# Patient Record
Sex: MALE | Race: WHITE | NOT HISPANIC OR LATINO | URBAN - METROPOLITAN AREA
[De-identification: names, ages, dates, MRNs, and addresses within clinical notes are randomized per-mention and may not be internally consistent; named-entity substitution may affect disease eponyms.]

---

## 2020-09-26 ENCOUNTER — LAB REQUISITION (OUTPATIENT)
Dept: LAB | Facility: HOSPITAL | Age: 65
End: 2020-09-26

## 2020-09-26 DIAGNOSIS — Z00.00 ROUTINE GENERAL MEDICAL EXAMINATION AT A HEALTH CARE FACILITY: ICD-10-CM

## 2020-09-26 LAB
BASOPHILS # BLD AUTO: 0.08 10*3/MM3 (ref 0–0.2)
BASOPHILS NFR BLD AUTO: 0.4 % (ref 0–1.5)
DEPRECATED RDW RBC AUTO: 40.4 FL (ref 37–54)
EOSINOPHIL # BLD AUTO: 0.02 10*3/MM3 (ref 0–0.4)
EOSINOPHIL NFR BLD AUTO: 0.1 % (ref 0.3–6.2)
ERYTHROCYTE [DISTWIDTH] IN BLOOD BY AUTOMATED COUNT: 14.4 % (ref 12.3–15.4)
HCT VFR BLD AUTO: 37 % (ref 37.5–51)
HGB BLD-MCNC: 11.5 G/DL (ref 13–17.7)
INR PPP: 1.51 (ref 0.9–1.1)
LARGE PLATELETS: NORMAL
LYMPHOCYTES # BLD AUTO: 0.97 10*3/MM3 (ref 0.7–3.1)
LYMPHOCYTES NFR BLD AUTO: 4.5 % (ref 19.6–45.3)
MCH RBC QN AUTO: 24.5 PG (ref 26.6–33)
MCHC RBC AUTO-ENTMCNC: 31.1 G/DL (ref 31.5–35.7)
MCV RBC AUTO: 78.9 FL (ref 79–97)
MONOCYTES # BLD AUTO: 1.59 10*3/MM3 (ref 0.1–0.9)
MONOCYTES NFR BLD AUTO: 7.3 % (ref 5–12)
NEUTROPHILS NFR BLD AUTO: 18.82 10*3/MM3 (ref 1.7–7)
NEUTROPHILS NFR BLD AUTO: 86.7 % (ref 42.7–76)
PLATELET # BLD AUTO: 252 10*3/MM3 (ref 140–450)
PMV BLD AUTO: 13.3 FL (ref 6–12)
PROTHROMBIN TIME: 17.9 SECONDS (ref 11.7–14.2)
RBC # BLD AUTO: 4.69 10*6/MM3 (ref 4.14–5.8)
WBC # BLD AUTO: 21.69 10*3/MM3 (ref 3.4–10.8)

## 2020-09-26 PROCEDURE — 85025 COMPLETE CBC W/AUTO DIFF WBC: CPT

## 2020-09-26 PROCEDURE — 85610 PROTHROMBIN TIME: CPT

## 2020-09-26 PROCEDURE — 85007 BL SMEAR W/DIFF WBC COUNT: CPT

## 2020-11-14 ENCOUNTER — LAB REQUISITION (OUTPATIENT)
Dept: LAB | Facility: HOSPITAL | Age: 65
End: 2020-11-14

## 2020-11-14 DIAGNOSIS — Z00.00 ROUTINE GENERAL MEDICAL EXAMINATION AT A HEALTH CARE FACILITY: ICD-10-CM

## 2020-11-14 LAB
INR PPP: 3.53 (ref 0.9–1.1)
PROTHROMBIN TIME: 35.1 SECONDS (ref 11.7–14.2)

## 2020-11-14 PROCEDURE — 85610 PROTHROMBIN TIME: CPT

## 2020-11-21 ENCOUNTER — LAB REQUISITION (OUTPATIENT)
Dept: LAB | Facility: HOSPITAL | Age: 65
End: 2020-11-21

## 2020-11-21 DIAGNOSIS — Z00.00 ROUTINE GENERAL MEDICAL EXAMINATION AT A HEALTH CARE FACILITY: ICD-10-CM

## 2020-11-21 LAB
ANION GAP SERPL CALCULATED.3IONS-SCNC: 8.3 MMOL/L (ref 5–15)
BASOPHILS # BLD AUTO: 0.03 10*3/MM3 (ref 0–0.2)
BASOPHILS NFR BLD AUTO: 0.4 % (ref 0–1.5)
BUN SERPL-MCNC: 15 MG/DL (ref 8–23)
BUN/CREAT SERPL: 11.9 (ref 7–25)
CALCIUM SPEC-SCNC: 7.6 MG/DL (ref 8.6–10.5)
CHLORIDE SERPL-SCNC: 104 MMOL/L (ref 98–107)
CO2 SERPL-SCNC: 25.7 MMOL/L (ref 22–29)
CREAT SERPL-MCNC: 1.26 MG/DL (ref 0.76–1.27)
DEPRECATED RDW RBC AUTO: 46.8 FL (ref 37–54)
EOSINOPHIL # BLD AUTO: 0.16 10*3/MM3 (ref 0–0.4)
EOSINOPHIL NFR BLD AUTO: 2.2 % (ref 0.3–6.2)
ERYTHROCYTE [DISTWIDTH] IN BLOOD BY AUTOMATED COUNT: 15.2 % (ref 12.3–15.4)
GFR SERPL CREATININE-BSD FRML MDRD: 57 ML/MIN/1.73
GLUCOSE SERPL-MCNC: 48 MG/DL (ref 65–99)
HCT VFR BLD AUTO: 25.2 % (ref 37.5–51)
HGB BLD-MCNC: 7.6 G/DL (ref 13–17.7)
IMM GRANULOCYTES # BLD AUTO: 0.02 10*3/MM3 (ref 0–0.05)
IMM GRANULOCYTES NFR BLD AUTO: 0.3 % (ref 0–0.5)
LYMPHOCYTES # BLD AUTO: 1.02 10*3/MM3 (ref 0.7–3.1)
LYMPHOCYTES NFR BLD AUTO: 13.9 % (ref 19.6–45.3)
MCH RBC QN AUTO: 25.3 PG (ref 26.6–33)
MCHC RBC AUTO-ENTMCNC: 30.2 G/DL (ref 31.5–35.7)
MCV RBC AUTO: 84 FL (ref 79–97)
MONOCYTES # BLD AUTO: 0.56 10*3/MM3 (ref 0.1–0.9)
MONOCYTES NFR BLD AUTO: 7.6 % (ref 5–12)
NEUTROPHILS NFR BLD AUTO: 5.54 10*3/MM3 (ref 1.7–7)
NEUTROPHILS NFR BLD AUTO: 75.6 % (ref 42.7–76)
NRBC BLD AUTO-RTO: 0 /100 WBC (ref 0–0.2)
PLATELET # BLD AUTO: 298 10*3/MM3 (ref 140–450)
PMV BLD AUTO: 11.6 FL (ref 6–12)
POTASSIUM SERPL-SCNC: 4.3 MMOL/L (ref 3.5–5.2)
RBC # BLD AUTO: 3 10*6/MM3 (ref 4.14–5.8)
SODIUM SERPL-SCNC: 138 MMOL/L (ref 136–145)
WBC # BLD AUTO: 7.33 10*3/MM3 (ref 3.4–10.8)

## 2020-11-21 PROCEDURE — 80048 BASIC METABOLIC PNL TOTAL CA: CPT

## 2020-11-21 PROCEDURE — 85025 COMPLETE CBC W/AUTO DIFF WBC: CPT

## 2020-11-28 ENCOUNTER — LAB REQUISITION (OUTPATIENT)
Dept: LAB | Facility: HOSPITAL | Age: 65
End: 2020-11-28

## 2020-11-28 DIAGNOSIS — Z00.00 ROUTINE GENERAL MEDICAL EXAMINATION AT A HEALTH CARE FACILITY: ICD-10-CM

## 2020-11-28 LAB
INR PPP: 3.18 (ref 0.9–1.1)
PROTHROMBIN TIME: 32.4 SECONDS (ref 11.7–14.2)

## 2020-11-28 PROCEDURE — 85610 PROTHROMBIN TIME: CPT

## 2020-12-07 ENCOUNTER — LAB REQUISITION (OUTPATIENT)
Dept: LAB | Facility: HOSPITAL | Age: 65
End: 2020-12-07

## 2020-12-07 DIAGNOSIS — Z00.00 ROUTINE GENERAL MEDICAL EXAMINATION AT A HEALTH CARE FACILITY: ICD-10-CM

## 2020-12-07 LAB
INR PPP: 1.78 (ref 0.9–1.1)
PROTHROMBIN TIME: 20.4 SECONDS (ref 11.7–14.2)

## 2020-12-07 PROCEDURE — 85610 PROTHROMBIN TIME: CPT

## 2020-12-19 ENCOUNTER — LAB REQUISITION (OUTPATIENT)
Dept: LAB | Facility: HOSPITAL | Age: 65
End: 2020-12-19

## 2020-12-19 DIAGNOSIS — Z00.00 ROUTINE GENERAL MEDICAL EXAMINATION AT A HEALTH CARE FACILITY: ICD-10-CM

## 2020-12-19 LAB
ANION GAP SERPL CALCULATED.3IONS-SCNC: 13.9 MMOL/L (ref 5–15)
BASOPHILS # BLD AUTO: 0.04 10*3/MM3 (ref 0–0.2)
BASOPHILS NFR BLD AUTO: 0.4 % (ref 0–1.5)
BUN SERPL-MCNC: 13 MG/DL (ref 8–23)
BUN/CREAT SERPL: 8.5 (ref 7–25)
CALCIUM SPEC-SCNC: 8.9 MG/DL (ref 8.6–10.5)
CHLORIDE SERPL-SCNC: 103 MMOL/L (ref 98–107)
CO2 SERPL-SCNC: 23.1 MMOL/L (ref 22–29)
CREAT SERPL-MCNC: 1.53 MG/DL (ref 0.76–1.27)
DEPRECATED RDW RBC AUTO: 44.7 FL (ref 37–54)
EOSINOPHIL # BLD AUTO: 0.14 10*3/MM3 (ref 0–0.4)
EOSINOPHIL NFR BLD AUTO: 1.3 % (ref 0.3–6.2)
ERYTHROCYTE [DISTWIDTH] IN BLOOD BY AUTOMATED COUNT: 15.1 % (ref 12.3–15.4)
GFR SERPL CREATININE-BSD FRML MDRD: 46 ML/MIN/1.73
GLUCOSE SERPL-MCNC: 55 MG/DL (ref 65–99)
HCT VFR BLD AUTO: 34.4 % (ref 37.5–51)
HGB BLD-MCNC: 10.3 G/DL (ref 13–17.7)
IMM GRANULOCYTES # BLD AUTO: 0.03 10*3/MM3 (ref 0–0.05)
IMM GRANULOCYTES NFR BLD AUTO: 0.3 % (ref 0–0.5)
LYMPHOCYTES # BLD AUTO: 0.86 10*3/MM3 (ref 0.7–3.1)
LYMPHOCYTES NFR BLD AUTO: 8.1 % (ref 19.6–45.3)
MCH RBC QN AUTO: 24.5 PG (ref 26.6–33)
MCHC RBC AUTO-ENTMCNC: 29.9 G/DL (ref 31.5–35.7)
MCV RBC AUTO: 81.9 FL (ref 79–97)
MONOCYTES # BLD AUTO: 0.65 10*3/MM3 (ref 0.1–0.9)
MONOCYTES NFR BLD AUTO: 6.2 % (ref 5–12)
NEUTROPHILS NFR BLD AUTO: 8.84 10*3/MM3 (ref 1.7–7)
NEUTROPHILS NFR BLD AUTO: 83.7 % (ref 42.7–76)
NRBC BLD AUTO-RTO: 0 /100 WBC (ref 0–0.2)
PLATELET # BLD AUTO: 193 10*3/MM3 (ref 140–450)
PMV BLD AUTO: 12.2 FL (ref 6–12)
POTASSIUM SERPL-SCNC: 4.5 MMOL/L (ref 3.5–5.2)
RBC # BLD AUTO: 4.2 10*6/MM3 (ref 4.14–5.8)
SODIUM SERPL-SCNC: 140 MMOL/L (ref 136–145)
WBC # BLD AUTO: 10.56 10*3/MM3 (ref 3.4–10.8)

## 2020-12-19 PROCEDURE — 85025 COMPLETE CBC W/AUTO DIFF WBC: CPT

## 2020-12-19 PROCEDURE — 80048 BASIC METABOLIC PNL TOTAL CA: CPT

## 2024-06-14 ENCOUNTER — HOSPITAL ENCOUNTER (INPATIENT)
Facility: HOSPITAL | Age: 69
LOS: 5 days | Discharge: NURSING, REHAB OR LONG-TERM CARE | End: 2024-06-19
Attending: EMERGENCY MEDICINE | Admitting: INTERNAL MEDICINE
Payer: MEDICARE

## 2024-06-14 ENCOUNTER — APPOINTMENT (OUTPATIENT)
Dept: CT IMAGING | Facility: HOSPITAL | Age: 69
End: 2024-06-14
Payer: MEDICARE

## 2024-06-14 ENCOUNTER — APPOINTMENT (OUTPATIENT)
Dept: GENERAL RADIOLOGY | Facility: HOSPITAL | Age: 69
End: 2024-06-14
Payer: MEDICARE

## 2024-06-14 DIAGNOSIS — J96.01 ACUTE RESPIRATORY FAILURE WITH HYPOXIA AND HYPERCAPNIA: ICD-10-CM

## 2024-06-14 DIAGNOSIS — I50.23 ACUTE ON CHRONIC SYSTOLIC CONGESTIVE HEART FAILURE: Primary | ICD-10-CM

## 2024-06-14 DIAGNOSIS — J96.02 ACUTE RESPIRATORY FAILURE WITH HYPOXIA AND HYPERCAPNIA: ICD-10-CM

## 2024-06-14 DIAGNOSIS — J44.1 COPD EXACERBATION: ICD-10-CM

## 2024-06-14 DIAGNOSIS — E11.40 TYPE 2 DIABETES MELLITUS WITH DIABETIC NEUROPATHY, UNSPECIFIED WHETHER LONG TERM INSULIN USE: ICD-10-CM

## 2024-06-14 PROBLEM — Z86.73 HISTORY OF CVA (CEREBROVASCULAR ACCIDENT): Status: ACTIVE | Noted: 2024-06-14

## 2024-06-14 PROBLEM — I10 ESSENTIAL HYPERTENSION: Status: ACTIVE | Noted: 2024-06-14

## 2024-06-14 PROBLEM — N17.9 AKI (ACUTE KIDNEY INJURY): Status: ACTIVE | Noted: 2024-06-14

## 2024-06-14 PROBLEM — D64.9 ANEMIA: Status: ACTIVE | Noted: 2024-06-14

## 2024-06-14 PROBLEM — I48.0 PAROXYSMAL ATRIAL FIBRILLATION: Status: ACTIVE | Noted: 2024-06-14

## 2024-06-14 PROBLEM — E78.2 MIXED HYPERLIPIDEMIA: Status: ACTIVE | Noted: 2024-06-14

## 2024-06-14 LAB
ALBUMIN SERPL-MCNC: 3.7 G/DL (ref 3.5–5.2)
ALBUMIN/GLOB SERPL: 1 G/DL
ALP SERPL-CCNC: 102 U/L (ref 39–117)
ALT SERPL W P-5'-P-CCNC: 15 U/L (ref 1–41)
ANION GAP SERPL CALCULATED.3IONS-SCNC: 10 MMOL/L (ref 5–15)
ANION GAP SERPL CALCULATED.3IONS-SCNC: 8 MMOL/L (ref 5–15)
ARTERIAL PATENCY WRIST A: ABNORMAL
AST SERPL-CCNC: 19 U/L (ref 1–40)
ATMOSPHERIC PRESS: ABNORMAL MM[HG]
BACTERIA UR QL AUTO: ABNORMAL /HPF
BASE EXCESS BLDA CALC-SCNC: -2.9 MMOL/L (ref 0–2)
BASOPHILS # BLD AUTO: 0.03 10*3/MM3 (ref 0–0.2)
BASOPHILS # BLD MANUAL: 0 10*3/MM3 (ref 0–0.2)
BASOPHILS NFR BLD AUTO: 0.3 % (ref 0–1.5)
BASOPHILS NFR BLD MANUAL: 0 % (ref 0–1.5)
BDY SITE: ABNORMAL
BILIRUB SERPL-MCNC: 0.4 MG/DL (ref 0–1.2)
BILIRUB UR QL STRIP: NEGATIVE
BODY TEMPERATURE: 37
BUN SERPL-MCNC: 24 MG/DL (ref 8–23)
BUN SERPL-MCNC: 26 MG/DL (ref 8–23)
BUN/CREAT SERPL: 10.7 (ref 7–25)
BUN/CREAT SERPL: 11.4 (ref 7–25)
CALCIUM SPEC-SCNC: 8.6 MG/DL (ref 8.6–10.5)
CALCIUM SPEC-SCNC: 8.8 MG/DL (ref 8.6–10.5)
CHLORIDE SERPL-SCNC: 106 MMOL/L (ref 98–107)
CHLORIDE SERPL-SCNC: 108 MMOL/L (ref 98–107)
CLARITY UR: CLEAR
CO2 BLDA-SCNC: 26.2 MMOL/L (ref 22–33)
CO2 SERPL-SCNC: 23 MMOL/L (ref 22–29)
CO2 SERPL-SCNC: 25 MMOL/L (ref 22–29)
COHGB MFR BLD: 1.5 % (ref 0–2)
COLOR UR: YELLOW
CREAT SERPL-MCNC: 2.24 MG/DL (ref 0.76–1.27)
CREAT SERPL-MCNC: 2.29 MG/DL (ref 0.76–1.27)
DEPRECATED RDW RBC AUTO: 48.3 FL (ref 37–54)
DEPRECATED RDW RBC AUTO: 49.2 FL (ref 37–54)
EGFRCR SERPLBLD CKD-EPI 2021: 30 ML/MIN/1.73
EGFRCR SERPLBLD CKD-EPI 2021: 30.8 ML/MIN/1.73
EOSINOPHIL # BLD AUTO: 0.08 10*3/MM3 (ref 0–0.4)
EOSINOPHIL # BLD MANUAL: 0.42 10*3/MM3 (ref 0–0.4)
EOSINOPHIL NFR BLD AUTO: 0.8 % (ref 0.3–6.2)
EOSINOPHIL NFR BLD MANUAL: 4 % (ref 0.3–6.2)
EPAP: 0
ERYTHROCYTE [DISTWIDTH] IN BLOOD BY AUTOMATED COUNT: 15.8 % (ref 12.3–15.4)
ERYTHROCYTE [DISTWIDTH] IN BLOOD BY AUTOMATED COUNT: 15.9 % (ref 12.3–15.4)
FERRITIN SERPL-MCNC: 115.1 NG/ML (ref 30–400)
FLUAV RNA RESP QL NAA+PROBE: NOT DETECTED
FLUBV RNA RESP QL NAA+PROBE: NOT DETECTED
FOLATE SERPL-MCNC: >20 NG/ML (ref 4.78–24.2)
GEN 5 2HR TROPONIN T REFLEX: 117 NG/L
GLOBULIN UR ELPH-MCNC: 3.8 GM/DL
GLUCOSE BLDC GLUCOMTR-MCNC: 183 MG/DL (ref 70–130)
GLUCOSE BLDC GLUCOMTR-MCNC: 217 MG/DL (ref 70–130)
GLUCOSE BLDC GLUCOMTR-MCNC: 224 MG/DL (ref 70–130)
GLUCOSE BLDC GLUCOMTR-MCNC: 281 MG/DL (ref 70–130)
GLUCOSE SERPL-MCNC: 129 MG/DL (ref 65–99)
GLUCOSE SERPL-MCNC: 142 MG/DL (ref 65–99)
GLUCOSE UR STRIP-MCNC: NEGATIVE MG/DL
HBA1C MFR BLD: 5.3 % (ref 4.8–5.6)
HCO3 BLDA-SCNC: 24.5 MMOL/L (ref 20–26)
HCT VFR BLD AUTO: 29.9 % (ref 37.5–51)
HCT VFR BLD AUTO: 30.5 % (ref 37.5–51)
HCT VFR BLD CALC: 28 % (ref 38–51)
HGB BLD-MCNC: 8.9 G/DL (ref 13–17.7)
HGB BLD-MCNC: 8.9 G/DL (ref 13–17.7)
HGB BLDA-MCNC: 9.1 G/DL (ref 13.5–17.5)
HGB UR QL STRIP.AUTO: NEGATIVE
HOLD SPECIMEN: NORMAL
HYALINE CASTS UR QL AUTO: ABNORMAL /LPF
IMM GRANULOCYTES # BLD AUTO: 0.06 10*3/MM3 (ref 0–0.05)
IMM GRANULOCYTES NFR BLD AUTO: 0.6 % (ref 0–0.5)
INHALED O2 CONCENTRATION: 36 %
IPAP: 0
IRON 24H UR-MRATE: 39 MCG/DL (ref 59–158)
IRON SATN MFR SERPL: 15 % (ref 20–50)
KETONES UR QL STRIP: NEGATIVE
LEUKOCYTE ESTERASE UR QL STRIP.AUTO: ABNORMAL
LYMPHOCYTES # BLD AUTO: 0.3 10*3/MM3 (ref 0.7–3.1)
LYMPHOCYTES # BLD MANUAL: 0.32 10*3/MM3 (ref 0.7–3.1)
LYMPHOCYTES NFR BLD AUTO: 2.8 % (ref 19.6–45.3)
LYMPHOCYTES NFR BLD MANUAL: 4 % (ref 5–12)
MCH RBC QN AUTO: 24.7 PG (ref 26.6–33)
MCH RBC QN AUTO: 25.1 PG (ref 26.6–33)
MCHC RBC AUTO-ENTMCNC: 29.2 G/DL (ref 31.5–35.7)
MCHC RBC AUTO-ENTMCNC: 29.8 G/DL (ref 31.5–35.7)
MCV RBC AUTO: 84.5 FL (ref 79–97)
MCV RBC AUTO: 84.7 FL (ref 79–97)
METHGB BLD QL: 0.4 % (ref 0–1.5)
MODALITY: ABNORMAL
MONOCYTES # BLD AUTO: 0.12 10*3/MM3 (ref 0.1–0.9)
MONOCYTES # BLD: 0.42 10*3/MM3 (ref 0.1–0.9)
MONOCYTES NFR BLD AUTO: 1.1 % (ref 5–12)
NEUTROPHILS # BLD AUTO: 9.43 10*3/MM3 (ref 1.7–7)
NEUTROPHILS NFR BLD AUTO: 9.96 10*3/MM3 (ref 1.7–7)
NEUTROPHILS NFR BLD AUTO: 94.4 % (ref 42.7–76)
NEUTROPHILS NFR BLD MANUAL: 89 % (ref 42.7–76)
NITRITE UR QL STRIP: NEGATIVE
NRBC BLD AUTO-RTO: 0 /100 WBC (ref 0–0.2)
NT-PROBNP SERPL-MCNC: 4208 PG/ML (ref 0–900)
OXYHGB MFR BLDV: 96.2 % (ref 94–99)
PAW @ PEAK INSP FLOW SETTING VENT: 0 CMH2O
PCO2 BLDA: 55.7 MM HG (ref 35–45)
PCO2 TEMP ADJ BLD: 55.7 MM HG (ref 35–48)
PH BLDA: 7.25 PH UNITS (ref 7.35–7.45)
PH UR STRIP.AUTO: <=5 [PH] (ref 5–8)
PH, TEMP CORRECTED: 7.25 PH UNITS
PLAT MORPH BLD: NORMAL
PLATELET # BLD AUTO: 137 10*3/MM3 (ref 140–450)
PLATELET # BLD AUTO: 140 10*3/MM3 (ref 140–450)
PMV BLD AUTO: 13.1 FL (ref 6–12)
PMV BLD AUTO: 13.5 FL (ref 6–12)
PO2 BLDA: 99.9 MM HG (ref 83–108)
PO2 TEMP ADJ BLD: 99.9 MM HG (ref 83–108)
POTASSIUM SERPL-SCNC: 5.1 MMOL/L (ref 3.5–5.2)
POTASSIUM SERPL-SCNC: 5.1 MMOL/L (ref 3.5–5.2)
PROT SERPL-MCNC: 7.5 G/DL (ref 6–8.5)
PROT UR QL STRIP: NEGATIVE
QT INTERVAL: 394 MS
QT INTERVAL: 394 MS
QTC INTERVAL: 437 MS
QTC INTERVAL: 439 MS
RBC # BLD AUTO: 3.54 10*6/MM3 (ref 4.14–5.8)
RBC # BLD AUTO: 3.6 10*6/MM3 (ref 4.14–5.8)
RBC # UR STRIP: ABNORMAL /HPF
RBC MORPH BLD: NORMAL
REF LAB TEST METHOD: ABNORMAL
RETICS # AUTO: 0.09 10*6/MM3 (ref 0.02–0.13)
RETICS/RBC NFR AUTO: 2.39 % (ref 0.7–1.9)
SARS-COV-2 RNA RESP QL NAA+PROBE: NOT DETECTED
SODIUM SERPL-SCNC: 139 MMOL/L (ref 136–145)
SODIUM SERPL-SCNC: 141 MMOL/L (ref 136–145)
SP GR UR STRIP: 1.01 (ref 1–1.03)
SQUAMOUS #/AREA URNS HPF: ABNORMAL /HPF
TIBC SERPL-MCNC: 265 MCG/DL (ref 298–536)
TOTAL RATE: 0 BREATHS/MINUTE
TRANSFERRIN SERPL-MCNC: 178 MG/DL (ref 200–360)
TROPONIN T DELTA: -4 NG/L
TROPONIN T SERPL HS-MCNC: 117 NG/L
TROPONIN T SERPL HS-MCNC: 121 NG/L
UROBILINOGEN UR QL STRIP: ABNORMAL
VARIANT LYMPHS NFR BLD MANUAL: 3 % (ref 19.6–45.3)
VIT B12 BLD-MCNC: 727 PG/ML (ref 211–946)
WBC # UR STRIP: ABNORMAL /HPF
WBC MORPH BLD: NORMAL
WBC NRBC COR # BLD AUTO: 10.55 10*3/MM3 (ref 3.4–10.8)
WBC NRBC COR # BLD AUTO: 10.6 10*3/MM3 (ref 3.4–10.8)
WHOLE BLOOD HOLD COAG: NORMAL
WHOLE BLOOD HOLD SPECIMEN: NORMAL
YEAST URNS QL MICRO: ABNORMAL /HPF

## 2024-06-14 PROCEDURE — 25010000002 FUROSEMIDE PER 20 MG: Performed by: EMERGENCY MEDICINE

## 2024-06-14 PROCEDURE — 82728 ASSAY OF FERRITIN: CPT | Performed by: PHYSICIAN ASSISTANT

## 2024-06-14 PROCEDURE — 99291 CRITICAL CARE FIRST HOUR: CPT

## 2024-06-14 PROCEDURE — 93005 ELECTROCARDIOGRAM TRACING: CPT | Performed by: EMERGENCY MEDICINE

## 2024-06-14 PROCEDURE — 99223 1ST HOSP IP/OBS HIGH 75: CPT | Performed by: INTERNAL MEDICINE

## 2024-06-14 PROCEDURE — 94640 AIRWAY INHALATION TREATMENT: CPT

## 2024-06-14 PROCEDURE — 71045 X-RAY EXAM CHEST 1 VIEW: CPT

## 2024-06-14 PROCEDURE — 94799 UNLISTED PULMONARY SVC/PX: CPT

## 2024-06-14 PROCEDURE — 93005 ELECTROCARDIOGRAM TRACING: CPT | Performed by: INTERNAL MEDICINE

## 2024-06-14 PROCEDURE — 84484 ASSAY OF TROPONIN QUANT: CPT | Performed by: EMERGENCY MEDICINE

## 2024-06-14 PROCEDURE — 83050 HGB METHEMOGLOBIN QUAN: CPT

## 2024-06-14 PROCEDURE — 94660 CPAP INITIATION&MGMT: CPT

## 2024-06-14 PROCEDURE — 85007 BL SMEAR W/DIFF WBC COUNT: CPT | Performed by: EMERGENCY MEDICINE

## 2024-06-14 PROCEDURE — 36415 COLL VENOUS BLD VENIPUNCTURE: CPT

## 2024-06-14 PROCEDURE — 36600 WITHDRAWAL OF ARTERIAL BLOOD: CPT

## 2024-06-14 PROCEDURE — 81001 URINALYSIS AUTO W/SCOPE: CPT | Performed by: INTERNAL MEDICINE

## 2024-06-14 PROCEDURE — 85045 AUTOMATED RETICULOCYTE COUNT: CPT | Performed by: INTERNAL MEDICINE

## 2024-06-14 PROCEDURE — 85025 COMPLETE CBC W/AUTO DIFF WBC: CPT | Performed by: EMERGENCY MEDICINE

## 2024-06-14 PROCEDURE — 63710000001 INSULIN LISPRO (HUMAN) PER 5 UNITS: Performed by: PHYSICIAN ASSISTANT

## 2024-06-14 PROCEDURE — 83540 ASSAY OF IRON: CPT | Performed by: PHYSICIAN ASSISTANT

## 2024-06-14 PROCEDURE — 82948 REAGENT STRIP/BLOOD GLUCOSE: CPT

## 2024-06-14 PROCEDURE — 83036 HEMOGLOBIN GLYCOSYLATED A1C: CPT | Performed by: INTERNAL MEDICINE

## 2024-06-14 PROCEDURE — 82375 ASSAY CARBOXYHB QUANT: CPT

## 2024-06-14 PROCEDURE — 99222 1ST HOSP IP/OBS MODERATE 55: CPT | Performed by: INTERNAL MEDICINE

## 2024-06-14 PROCEDURE — 70450 CT HEAD/BRAIN W/O DYE: CPT

## 2024-06-14 PROCEDURE — 80053 COMPREHEN METABOLIC PANEL: CPT | Performed by: EMERGENCY MEDICINE

## 2024-06-14 PROCEDURE — 82805 BLOOD GASES W/O2 SATURATION: CPT

## 2024-06-14 PROCEDURE — 94761 N-INVAS EAR/PLS OXIMETRY MLT: CPT

## 2024-06-14 PROCEDURE — 82607 VITAMIN B-12: CPT | Performed by: INTERNAL MEDICINE

## 2024-06-14 PROCEDURE — 25010000002 METHYLPREDNISOLONE PER 125 MG: Performed by: EMERGENCY MEDICINE

## 2024-06-14 PROCEDURE — 85025 COMPLETE CBC W/AUTO DIFF WBC: CPT | Performed by: INTERNAL MEDICINE

## 2024-06-14 PROCEDURE — 82746 ASSAY OF FOLIC ACID SERUM: CPT | Performed by: INTERNAL MEDICINE

## 2024-06-14 PROCEDURE — 83880 ASSAY OF NATRIURETIC PEPTIDE: CPT | Performed by: EMERGENCY MEDICINE

## 2024-06-14 PROCEDURE — 87636 SARSCOV2 & INF A&B AMP PRB: CPT | Performed by: EMERGENCY MEDICINE

## 2024-06-14 PROCEDURE — 84484 ASSAY OF TROPONIN QUANT: CPT | Performed by: INTERNAL MEDICINE

## 2024-06-14 PROCEDURE — 93010 ELECTROCARDIOGRAM REPORT: CPT | Performed by: INTERNAL MEDICINE

## 2024-06-14 PROCEDURE — 84466 ASSAY OF TRANSFERRIN: CPT | Performed by: PHYSICIAN ASSISTANT

## 2024-06-14 RX ORDER — NICOTINE POLACRILEX 4 MG
15 LOZENGE BUCCAL
Status: DISCONTINUED | OUTPATIENT
Start: 2024-06-14 | End: 2024-06-19 | Stop reason: HOSPADM

## 2024-06-14 RX ORDER — PANTOPRAZOLE SODIUM 40 MG/1
40 TABLET, DELAYED RELEASE ORAL
Status: DISCONTINUED | OUTPATIENT
Start: 2024-06-14 | End: 2024-06-19 | Stop reason: HOSPADM

## 2024-06-14 RX ORDER — INSULIN GLARGINE 100 [IU]/ML
15 INJECTION, SOLUTION SUBCUTANEOUS DAILY
COMMUNITY
End: 2024-06-19 | Stop reason: HOSPADM

## 2024-06-14 RX ORDER — DOXAZOSIN 8 MG/1
8 TABLET ORAL 2 TIMES DAILY
COMMUNITY
End: 2024-06-19 | Stop reason: HOSPADM

## 2024-06-14 RX ORDER — UREA 10 %
5 LOTION (ML) TOPICAL NIGHTLY PRN
Status: DISCONTINUED | OUTPATIENT
Start: 2024-06-14 | End: 2024-06-17

## 2024-06-14 RX ORDER — NIFEDIPINE 90 MG/1
90 TABLET, EXTENDED RELEASE ORAL 2 TIMES DAILY
COMMUNITY
End: 2024-06-19 | Stop reason: HOSPADM

## 2024-06-14 RX ORDER — NITROGLYCERIN 0.4 MG/1
0.4 TABLET SUBLINGUAL
Status: DISCONTINUED | OUTPATIENT
Start: 2024-06-14 | End: 2024-06-19 | Stop reason: HOSPADM

## 2024-06-14 RX ORDER — SODIUM CHLORIDE 9 MG/ML
40 INJECTION, SOLUTION INTRAVENOUS AS NEEDED
Status: DISCONTINUED | OUTPATIENT
Start: 2024-06-14 | End: 2024-06-19 | Stop reason: HOSPADM

## 2024-06-14 RX ORDER — INSULIN LISPRO 100 [IU]/ML
3-14 INJECTION, SOLUTION INTRAVENOUS; SUBCUTANEOUS
Status: DISCONTINUED | OUTPATIENT
Start: 2024-06-14 | End: 2024-06-19 | Stop reason: HOSPADM

## 2024-06-14 RX ORDER — CARVEDILOL 12.5 MG/1
25 TABLET ORAL 2 TIMES DAILY WITH MEALS
Status: DISCONTINUED | OUTPATIENT
Start: 2024-06-14 | End: 2024-06-18

## 2024-06-14 RX ORDER — IPRATROPIUM BROMIDE AND ALBUTEROL SULFATE 2.5; .5 MG/3ML; MG/3ML
3 SOLUTION RESPIRATORY (INHALATION) ONCE
Status: COMPLETED | OUTPATIENT
Start: 2024-06-14 | End: 2024-06-14

## 2024-06-14 RX ORDER — ATORVASTATIN CALCIUM 40 MG/1
40 TABLET, FILM COATED ORAL NIGHTLY
Status: DISCONTINUED | OUTPATIENT
Start: 2024-06-14 | End: 2024-06-19 | Stop reason: HOSPADM

## 2024-06-14 RX ORDER — ATORVASTATIN CALCIUM 40 MG/1
40 TABLET, FILM COATED ORAL DAILY
COMMUNITY

## 2024-06-14 RX ORDER — ACETAMINOPHEN 325 MG/1
650 TABLET ORAL EVERY 4 HOURS PRN
Status: DISCONTINUED | OUTPATIENT
Start: 2024-06-14 | End: 2024-06-19 | Stop reason: HOSPADM

## 2024-06-14 RX ORDER — METHYLPREDNISOLONE SODIUM SUCCINATE 125 MG/2ML
125 INJECTION, POWDER, LYOPHILIZED, FOR SOLUTION INTRAMUSCULAR; INTRAVENOUS ONCE
Status: COMPLETED | OUTPATIENT
Start: 2024-06-14 | End: 2024-06-14

## 2024-06-14 RX ORDER — GABAPENTIN 100 MG/1
100 CAPSULE ORAL 4 TIMES DAILY
COMMUNITY
End: 2024-06-19 | Stop reason: HOSPADM

## 2024-06-14 RX ORDER — IBUPROFEN 600 MG/1
1 TABLET ORAL
Status: DISCONTINUED | OUTPATIENT
Start: 2024-06-14 | End: 2024-06-19 | Stop reason: HOSPADM

## 2024-06-14 RX ORDER — IPRATROPIUM BROMIDE AND ALBUTEROL SULFATE 2.5; .5 MG/3ML; MG/3ML
3 SOLUTION RESPIRATORY (INHALATION)
Status: DISCONTINUED | OUTPATIENT
Start: 2024-06-14 | End: 2024-06-14

## 2024-06-14 RX ORDER — LAMOTRIGINE 25 MG/1
25 TABLET ORAL DAILY
Status: DISCONTINUED | OUTPATIENT
Start: 2024-06-14 | End: 2024-06-17

## 2024-06-14 RX ORDER — POLYETHYLENE GLYCOL 3350 17 G/17G
17 POWDER, FOR SOLUTION ORAL DAILY PRN
Status: DISCONTINUED | OUTPATIENT
Start: 2024-06-14 | End: 2024-06-19 | Stop reason: HOSPADM

## 2024-06-14 RX ORDER — CARVEDILOL 25 MG/1
25 TABLET ORAL 2 TIMES DAILY WITH MEALS
COMMUNITY

## 2024-06-14 RX ORDER — IPRATROPIUM BROMIDE AND ALBUTEROL SULFATE 2.5; .5 MG/3ML; MG/3ML
3 SOLUTION RESPIRATORY (INHALATION) EVERY 4 HOURS PRN
Status: DISCONTINUED | OUTPATIENT
Start: 2024-06-14 | End: 2024-06-19 | Stop reason: HOSPADM

## 2024-06-14 RX ORDER — PAROXETINE 10 MG/1
10 TABLET, FILM COATED ORAL EVERY MORNING
COMMUNITY

## 2024-06-14 RX ORDER — DEXTROSE MONOHYDRATE 25 G/50ML
25 INJECTION, SOLUTION INTRAVENOUS
Status: DISCONTINUED | OUTPATIENT
Start: 2024-06-14 | End: 2024-06-19 | Stop reason: HOSPADM

## 2024-06-14 RX ORDER — AMLODIPINE BESYLATE 10 MG/1
10 TABLET ORAL DAILY
COMMUNITY
End: 2024-06-19 | Stop reason: HOSPADM

## 2024-06-14 RX ORDER — SODIUM CHLORIDE 0.9 % (FLUSH) 0.9 %
10 SYRINGE (ML) INJECTION AS NEEDED
Status: DISCONTINUED | OUTPATIENT
Start: 2024-06-14 | End: 2024-06-15

## 2024-06-14 RX ORDER — AMOXICILLIN 250 MG
2 CAPSULE ORAL 2 TIMES DAILY
Status: DISCONTINUED | OUTPATIENT
Start: 2024-06-14 | End: 2024-06-19 | Stop reason: HOSPADM

## 2024-06-14 RX ORDER — FUROSEMIDE 10 MG/ML
80 INJECTION INTRAMUSCULAR; INTRAVENOUS ONCE
Status: COMPLETED | OUTPATIENT
Start: 2024-06-14 | End: 2024-06-14

## 2024-06-14 RX ORDER — SODIUM CHLORIDE 0.9 % (FLUSH) 0.9 %
10 SYRINGE (ML) INJECTION EVERY 12 HOURS SCHEDULED
Status: DISCONTINUED | OUTPATIENT
Start: 2024-06-14 | End: 2024-06-19 | Stop reason: HOSPADM

## 2024-06-14 RX ORDER — LAMOTRIGINE 25 MG/1
25 TABLET ORAL 3 TIMES DAILY
COMMUNITY

## 2024-06-14 RX ORDER — LISINOPRIL 40 MG/1
40 TABLET ORAL DAILY
COMMUNITY
End: 2024-06-19 | Stop reason: HOSPADM

## 2024-06-14 RX ORDER — SODIUM CHLORIDE 0.9 % (FLUSH) 0.9 %
10 SYRINGE (ML) INJECTION AS NEEDED
Status: DISCONTINUED | OUTPATIENT
Start: 2024-06-14 | End: 2024-06-19 | Stop reason: HOSPADM

## 2024-06-14 RX ORDER — BISACODYL 10 MG
10 SUPPOSITORY, RECTAL RECTAL DAILY PRN
Status: DISCONTINUED | OUTPATIENT
Start: 2024-06-14 | End: 2024-06-19 | Stop reason: HOSPADM

## 2024-06-14 RX ORDER — BISACODYL 5 MG/1
5 TABLET, DELAYED RELEASE ORAL DAILY PRN
Status: DISCONTINUED | OUTPATIENT
Start: 2024-06-14 | End: 2024-06-19 | Stop reason: HOSPADM

## 2024-06-14 RX ORDER — ASPIRIN 81 MG/1
81 TABLET, CHEWABLE ORAL DAILY
Status: DISCONTINUED | OUTPATIENT
Start: 2024-06-14 | End: 2024-06-19 | Stop reason: HOSPADM

## 2024-06-14 RX ADMIN — METHYLPREDNISOLONE SODIUM SUCCINATE 125 MG: 125 INJECTION, POWDER, FOR SOLUTION INTRAMUSCULAR; INTRAVENOUS at 04:30

## 2024-06-14 RX ADMIN — LAMOTRIGINE 25 MG: 25 TABLET ORAL at 10:20

## 2024-06-14 RX ADMIN — PANTOPRAZOLE SODIUM 40 MG: 40 TABLET, DELAYED RELEASE ORAL at 10:20

## 2024-06-14 RX ADMIN — ATORVASTATIN CALCIUM 40 MG: 40 TABLET, FILM COATED ORAL at 20:26

## 2024-06-14 RX ADMIN — INSULIN LISPRO 5 UNITS: 100 INJECTION, SOLUTION INTRAVENOUS; SUBCUTANEOUS at 20:02

## 2024-06-14 RX ADMIN — Medication 10 ML: at 22:28

## 2024-06-14 RX ADMIN — Medication 10 ML: at 10:22

## 2024-06-14 RX ADMIN — IPRATROPIUM BROMIDE AND ALBUTEROL SULFATE 3 ML: 2.5; .5 SOLUTION RESPIRATORY (INHALATION) at 04:24

## 2024-06-14 RX ADMIN — CARVEDILOL 25 MG: 12.5 TABLET, FILM COATED ORAL at 20:01

## 2024-06-14 RX ADMIN — FUROSEMIDE 80 MG: 10 INJECTION, SOLUTION INTRAMUSCULAR; INTRAVENOUS at 05:00

## 2024-06-14 RX ADMIN — INSULIN LISPRO 5 UNITS: 100 INJECTION, SOLUTION INTRAVENOUS; SUBCUTANEOUS at 22:32

## 2024-06-14 RX ADMIN — APIXABAN 5 MG: 5 TABLET, FILM COATED ORAL at 20:26

## 2024-06-14 RX ADMIN — ASPIRIN 81 MG CHEWABLE TABLET 81 MG: 81 TABLET CHEWABLE at 20:02

## 2024-06-14 RX ADMIN — CARVEDILOL 25 MG: 12.5 TABLET, FILM COATED ORAL at 10:20

## 2024-06-14 NOTE — ED NOTES
" Jimmie Saintmartin    Nursing Report ED to Floor:  Mental status: a&ox3  Ambulatory status: non-ambulatory- bilateral AKAs  Oxygen Therapy:  BIPAP  Cardiac Rhythm: NSR  Admitted from: [place]  Safety Concerns:  fall risk  Social Issues: n/a  ED Room #:  17    ED Nurse Phone Extension - [phone] or may call [phone].      HPI:   Chief Complaint   Patient presents with    Shortness of Breath       Past Medical History:  Past Medical History:   Diagnosis Date    Anemia     Atrial fibrillation     Dementia     Diabetes mellitus     GERD (gastroesophageal reflux disease)     Hyperlipidemia     Hypertension     Major depressive disorder     Pulmonary fibrosis     PVD (peripheral vascular disease)     Renal disorder     Stroke         Past Surgical History:  Past Surgical History:   Procedure Laterality Date    ABOVE KNEE AMPUTATION Bilateral         Admitting Doctor:   Jean Paul Whitt MD    Consulting Provider(s):  Consults       No orders found from 5/16/2024 to 6/15/2024.             Admitting Diagnosis:   The primary encounter diagnosis was Acute on chronic systolic congestive heart failure. Diagnoses of COPD exacerbation and Acute respiratory failure with hypoxia and hypercapnia were also pertinent to this visit.    Most Recent Vitals:   Vitals:    06/14/24 0503 06/14/24 0504 06/14/24 0505 06/14/24 0530   BP:    141/48   BP Location:       Patient Position:       Pulse: 71 74  72   Resp:    20   Temp:       TempSrc:       SpO2: 97%   99%   Weight:   109 kg (240 lb)    Height:   180.3 cm (71\")        Active LDAs/IV Access:   Lines, Drains & Airways       Active LDAs       Name Placement date Placement time Site Days    Peripheral IV 06/14/24 0410 Anterior;Left Forearm 06/14/24  0410  Forearm  less than 1    External Urinary Catheter 06/14/24  0505  --  less than 1                    Labs (abnormal labs have a star):   Labs Reviewed   COMPREHENSIVE METABOLIC PANEL - Abnormal; Notable for the following components:       Result " Value    Glucose 129 (*)     BUN 24 (*)     Creatinine 2.24 (*)     eGFR 30.8 (*)     All other components within normal limits    Narrative:     GFR Normal >60  Chronic Kidney Disease <60  Kidney Failure <15     BNP (IN-HOUSE) - Abnormal; Notable for the following components:    proBNP 4,208.0 (*)     All other components within normal limits    Narrative:     This assay is used as an aid in the diagnosis of individuals suspected of having heart failure. It can be used as an aid in the diagnosis of acute decompensated heart failure (ADHF) in patients presenting with signs and symptoms of ADHF to the emergency department (ED). In addition, NT-proBNP of <300 pg/mL indicates ADHF is not likely.    Age Range Result Interpretation  NT-proBNP Concentration (pg/mL:      <50             Positive            >450                   Gray                 300-450                    Negative             <300    50-75           Positive            >900                  Gray                300-900                  Negative            <300      >75             Positive            >1800                  Gray                300-1800                  Negative            <300   SINGLE HS TROPONIN T - Abnormal; Notable for the following components:    HS Troponin T 117 (*)     All other components within normal limits    Narrative:     High Sensitive Troponin T Reference Range:  <14.0 ng/L- Negative Female for AMI  <22.0 ng/L- Negative Male for AMI  >=14 - Abnormal Female indicating possible myocardial injury.  >=22 - Abnormal Male indicating possible myocardial injury.   Clinicians would have to utilize clinical acumen, EKG, Troponin, and serial changes to determine if it is an Acute Myocardial Infarction or myocardial injury due to an underlying chronic condition.        CBC WITH AUTO DIFFERENTIAL - Abnormal; Notable for the following components:    RBC 3.60 (*)     Hemoglobin 8.9 (*)     Hematocrit 30.5 (*)     MCH 24.7 (*)     MCHC  29.2 (*)     RDW 15.8 (*)     MPV 13.1 (*)     Platelets 137 (*)     All other components within normal limits    Narrative:     The previously reported component NRBC is no longer being reported. Previous result was 0.0 /100 WBC (Reference Range: 0.0-0.2 /100 WBC) on 6/14/2024 at 0423 EDT.   BLOOD GAS, ARTERIAL W/CO-OXIMETRY - Abnormal; Notable for the following components:    pH, Arterial 7.252 (*)     pCO2, Arterial 55.7 (*)     Base Excess, Arterial -2.9 (*)     Hemoglobin, Blood Gas 9.1 (*)     Hematocrit, Blood Gas 28.0 (*)     pCO2, Temperature Corrected 55.7 (*)     All other components within normal limits   MANUAL DIFFERENTIAL - Abnormal; Notable for the following components:    Neutrophil % 89.0 (*)     Lymphocyte % 3.0 (*)     Monocyte % 4.0 (*)     Neutrophils Absolute 9.43 (*)     Lymphocytes Absolute 0.32 (*)     Eosinophils Absolute 0.42 (*)     All other components within normal limits   COVID-19 AND FLU A/B, NP SWAB IN TRANSPORT MEDIA 1 HR TAT - Normal    Narrative:     Fact sheet for providers: https://www.fda.gov/media/608783/download    Fact sheet for patients: https://www.fda.gov/media/039887/download    Test performed by PCR.   COVID PRE-OP / PRE-PROCEDURE SCREENING ORDER (NO ISOLATION)    Narrative:     The following orders were created for panel order COVID PRE-OP / PRE-PROCEDURE SCREENING ORDER (NO ISOLATION) - Swab, Nasopharynx.  Procedure                               Abnormality         Status                     ---------                               -----------         ------                     COVID-19 and FLU A/B PCR...[[id number]]  Normal              Final result                 Please view results for these tests on the individual orders.   RAINBOW DRAW    Narrative:     The following orders were created for panel order [place] Draw.  Procedure                               Abnormality         Status                     ---------                               -----------          ------                     Green Top (Gel)[[id number]]                                  Final result               Lavender Top[[id number]]                                     Final result               Gold Top - SST[[id number]]                                   Final result               Luna Top[[id number]]                                         Final result               Light Blue Top[[id number]]                                   Final result                 Please view results for these tests on the individual orders.   BLOOD GAS, ARTERIAL   HIGH SENSITIVITIY TROPONIN T 2HR   CBC AND DIFFERENTIAL    Narrative:     The following orders were created for panel order CBC & Differential.  Procedure                               Abnormality         Status                     ---------                               -----------         ------                     CBC Auto Differential[[id number]]        Abnormal            Final result               Scan Slide[[id number]]                                                                    Please view results for these tests on the individual orders.   GREEN TOP   LAVENDER TOP   GOLD TOP - SST   GRAY TOP   LIGHT BLUE TOP       Meds Given in ED:   Medications   sodium chloride 0.9 % flush 10 mL (has no administration in time range)   ipratropium-albuterol (DUO-NEB) nebulizer solution 3 mL (3 mL Nebulization Given 6/14/24 0424)   methylPREDNISolone sodium succinate (SOLU-Medrol) injection 125 mg (125 mg Intravenous Given 6/14/24 0430)   furosemide (LASIX) injection 80 mg (80 mg Intravenous Given 6/14/24 0500)           Last NIH score:                                                          Dysphagia screening results:  Patient Factors Component (Dysphagia:Stroke or Rule-out)  Best Eye Response: 4-->(E4) spontaneous (06/14/24 0525)  Best Motor Response: 6-->(M6) obeys commands (06/14/24 0525)  Best Verbal Response: 5-->(V5) oriented (06/14/24 0525)  [place] Coma Scale Score: 15  (06/14/24 0525)     Michael Coma Scale:  No data recorded     CIWA:        Restraint Type:            Isolation Status:  No active isolations

## 2024-06-14 NOTE — ED PROVIDER NOTES
Subjective   History of Present Illness  Patient presents for evaluation of relatively acute onset shortness of breath and wheezing that started this morning.  Patient was found to be hypoxic at his living facility and oxygen was started and EMS was called who brought him in for further evaluation.  Patient states he is not having any pain and specifically no chest pain.  No fevers or chills.    History provided by:  Patient and EMS personnel      Review of Systems    Extensive past medical history including pulmonary fibrosis, respiratory failure, coronary artery disease, congestive heart failure, diabetes with bilateral lower extremity amputations.  He is currently anticoagulated on Eliquis    No Known Allergies    Past Surgical History:   Procedure Laterality Date    ABOVE KNEE AMPUTATION Bilateral        History reviewed. No pertinent family history.    Social History     Socioeconomic History    Marital status:    Tobacco Use    Smoking status: Never   Substance and Sexual Activity    Alcohol use: Not Currently    Drug use: Never    Sexual activity: Defer           Objective   Physical Exam  Constitutional:       General: He is not in acute distress.     Appearance: He is ill-appearing.   HENT:      Head: Normocephalic and atraumatic.   Eyes:      Conjunctiva/sclera: Conjunctivae normal.      Pupils: Pupils are equal, round, and reactive to light.   Cardiovascular:      Rate and Rhythm: Normal rate and regular rhythm.      Pulses: Normal pulses.      Heart sounds: No murmur heard.     No gallop.   Pulmonary:      Comments: Tachypnea with mildly increased work of breathing.  Diffuse expiratory wheezing with diminished air movement, no rhonchi  Abdominal:      General: Abdomen is flat. There is no distension.      Tenderness: There is no abdominal tenderness.   Musculoskeletal:         General: No swelling. Normal range of motion.      Comments: Previous bilateral lower extremity amputations above the  knee.  Stumps appear clean and dry   Skin:     General: Skin is warm and dry.      Capillary Refill: Capillary refill takes less than 2 seconds.   Neurological:      General: No focal deficit present.      Mental Status: He is alert and oriented to person, place, and time.   Psychiatric:         Mood and Affect: Mood normal.         Behavior: Behavior normal.         Critical Care    Performed by: Lincoln Silveira MD  Authorized by: Jean Paul Whitt MD    Critical care provider statement:     Critical care time (minutes):  40    Critical care time was exclusive of:  Separately billable procedures and treating other patients    Critical care was necessary to treat or prevent imminent or life-threatening deterioration of the following conditions:  Respiratory failure and cardiac failure    Critical care was time spent personally by me on the following activities:  Development of treatment plan with patient or surrogate, discussions with consultants, evaluation of patient's response to treatment, examination of patient, obtaining history from patient or surrogate, ordering and performing treatments and interventions, ordering and review of laboratory studies, ordering and review of radiographic studies, pulse oximetry, re-evaluation of patient's condition and review of old charts    I assumed direction of critical care for this patient from another provider in my specialty: no      Care discussed with: admitting provider               ED Course  ED Course as of 06/14/24 0609   Fri Jun 14, 2024   7335 Twelve-lead ECG independently interpreted by myself demonstrates normal sinus rhythm, no ST segment elevation or depression. [KB]   3267 Chest x-ray independently interpreted by myself demonstrates cardiomegaly with pulmonary vascular congestion and pleural effusion [KB]   0607 Laboratory workup independently interpreted by myself demonstrates hypercarbia and acidemia, elevated proBNP and elevated high-sensitivity troponin.   Elevated creatinine and anemia is also noted.  Patient's renal dysfunction, anemia or chronic abnormalities when viewed in Care Everywhere. [KB]      ED Course User Index  [KB] Lincoln Silveira MD                                             Medical Decision Making  Differential diagnosis includes exacerbation of pulmonary fibrosis, congestive heart failure with pulmonary edema or pleural effusion, COPD with extubation or asthma.  Lab and imaging studies were conducted.  DuoNebs, 125 mg IV methylprednisolone was given and supplemental oxygen was applied.    Patient was subsequently started on BiPAP and did have significant improvement in patient's respiratory state with these interventions.  He was started on diuretic 80 mg IV Lasix.  He will be admitted for further management.    Problems Addressed:  Acute on chronic systolic congestive heart failure: complicated acute illness or injury  Acute respiratory failure with hypoxia and hypercapnia: complicated acute illness or injury that poses a threat to life or bodily functions  COPD exacerbation: complicated acute illness or injury    Amount and/or Complexity of Data Reviewed  Independent Historian: EMS     Details: Prehospital course by EMS  External Data Reviewed: notes.     Details: 6/7/2024 reviewed most recent ER visit from outside hospital where patient was treated for a fall where no acute severe traumatic injuries were identified  Labs: ordered. Decision-making details documented in ED Course.  Radiology: ordered and independent interpretation performed. Decision-making details documented in ED Course.  ECG/medicine tests: ordered and independent interpretation performed. Decision-making details documented in ED Course.  Discussion of management or test interpretation with external provider(s): Hospital medicine consulted for admission    Risk  Prescription drug management.  Decision regarding hospitalization.    Critical Care  Total time providing critical  care: 40 minutes        Final diagnoses:   Acute on chronic systolic congestive heart failure   COPD exacerbation   Acute respiratory failure with hypoxia and hypercapnia       ED Disposition  ED Disposition       ED Disposition   Decision to Admit    Condition   --    Comment   Level of Care: Telemetry [5]   Diagnosis: Respiratory failure [711972]   Admitting Physician: JOSIAH ARREOLA [[id number]]   Attending Physician: JOSIAH ARREOLA [[id number]]   Certification: I Certify That Inpatient Hospital Services Are Medically Necessary For Greater Than 2 Midnights               Recent Results (from the past 24 hour(s))   Comprehensive Metabolic Panel    Collection Time: 06/14/24  4:09 AM    Specimen: Blood   Result Value Ref Range    Glucose 129 (H) 65 - 99 mg/dL    BUN 24 (H) 8 - 23 mg/dL    Creatinine 2.24 (H) 0.76 - 1.27 mg/dL    Sodium 139 136 - 145 mmol/L    Potassium 5.1 3.5 - 5.2 mmol/L    Chloride 106 98 - 107 mmol/L    CO2 25.0 22.0 - 29.0 mmol/L    Calcium 8.6 8.6 - 10.5 mg/dL    Total Protein 7.5 6.0 - 8.5 g/dL    Albumin 3.7 3.5 - 5.2 g/dL    ALT (SGPT) 15 1 - 41 U/L    AST (SGOT) 19 1 - 40 U/L    Alkaline Phosphatase 102 39 - 117 U/L    Total Bilirubin 0.4 0.0 - 1.2 mg/dL    Globulin 3.8 gm/dL    A/G Ratio 1.0 g/dL    BUN/Creatinine Ratio 10.7 7.0 - 25.0    Anion Gap 8.0 5.0 - 15.0 mmol/L    eGFR 30.8 (L) >60.0 mL/min/1.73   BNP    Collection Time: 06/14/24  4:09 AM    Specimen: Blood   Result Value Ref Range    proBNP 4,208.0 (H) 0.0 - 900.0 pg/mL   Single High Sensitivity Troponin T    Collection Time: 06/14/24  4:09 AM    Specimen: Blood   Result Value Ref Range    HS Troponin T 117 (C) <22 ng/L   Green Top (Gel)    Collection Time: 06/14/24  4:09 AM   Result Value Ref Range    Extra Tube Hold for add-ons.    Lavender Top    Collection Time: 06/14/24  4:09 AM   Result Value Ref Range    Extra Tube hold for add-on    Gold Top - SST    Collection Time: 06/14/24  4:09 AM   Result Value Ref Range    Extra Tube  Hold for add-ons.    Gray Top    Collection Time: 06/14/24  4:09 AM   Result Value Ref Range    Extra Tube Hold for add-ons.    Light Blue Top    Collection Time: 06/14/24  4:09 AM   Result Value Ref Range    Extra Tube Hold for add-ons.    CBC Auto Differential    Collection Time: 06/14/24  4:09 AM    Specimen: Blood   Result Value Ref Range    WBC 10.60 3.40 - 10.80 10*3/mm3    RBC 3.60 (L) 4.14 - 5.80 10*6/mm3    Hemoglobin 8.9 (L) 13.0 - 17.7 g/dL    Hematocrit 30.5 (L) 37.5 - 51.0 %    MCV 84.7 79.0 - 97.0 fL    MCH 24.7 (L) 26.6 - 33.0 pg    MCHC 29.2 (L) 31.5 - 35.7 g/dL    RDW 15.8 (H) 12.3 - 15.4 %    RDW-SD 48.3 37.0 - 54.0 fl    MPV 13.1 (H) 6.0 - 12.0 fL    Platelets 137 (L) 140 - 450 10*3/mm3   Manual Differential    Collection Time: 06/14/24  4:09 AM    Specimen: Blood   Result Value Ref Range    Neutrophil % 89.0 (H) 42.7 - 76.0 %    Lymphocyte % 3.0 (L) 19.6 - 45.3 %    Monocyte % 4.0 (L) 5.0 - 12.0 %    Eosinophil % 4.0 0.3 - 6.2 %    Basophil % 0.0 0.0 - 1.5 %    Neutrophils Absolute 9.43 (H) 1.70 - 7.00 10*3/mm3    Lymphocytes Absolute 0.32 (L) 0.70 - 3.10 10*3/mm3    Monocytes Absolute 0.42 0.10 - 0.90 10*3/mm3    Eosinophils Absolute 0.42 (H) 0.00 - 0.40 10*3/mm3    Basophils Absolute 0.00 0.00 - 0.20 10*3/mm3    RBC Morphology Normal Normal    WBC Morphology Normal Normal    Platelet Morphology Normal Normal   ECG 12 Lead ED Triage Standing Order; SOA    Collection Time: 06/14/24  4:11 AM   Result Value Ref Range    QT Interval 394 ms    QTC Interval 439 ms   COVID-19 and FLU A/B PCR, 1 HR TAT - Swab, Nasopharynx    Collection Time: 06/14/24  4:16 AM    Specimen: Nasopharynx; Swab   Result Value Ref Range    COVID19 Not Detected Not Detected - Ref. Range    Influenza A PCR Not Detected Not Detected    Influenza B PCR Not Detected Not Detected   Blood Gas, Arterial With Co-Ox    Collection Time: 06/14/24  4:33 AM    Specimen: Arterial Blood   Result Value Ref Range    Site Right Brachial      "Jim's Test N/A     pH, Arterial 7.252 (L) 7.350 - 7.450 pH units    pCO2, Arterial 55.7 (H) 35.0 - 45.0 mm Hg    pO2, Arterial 99.9 83.0 - 108.0 mm Hg    HCO3, Arterial 24.5 20.0 - 26.0 mmol/L    Base Excess, Arterial -2.9 (L) 0.0 - 2.0 mmol/L    Hemoglobin, Blood Gas 9.1 (L) 13.5 - 17.5 g/dL    Hematocrit, Blood Gas 28.0 (L) 38.0 - 51.0 %    Oxyhemoglobin 96.2 94 - 99 %    Methemoglobin 0.40 0.00 - 1.50 %    Carboxyhemoglobin 1.5 0 - 2 %    CO2 Content 26.2 22 - 33 mmol/L    Temperature 37.0     Barometric Pressure for Blood Gas      Modality Nasal Cannula     FIO2 36 %    Rate 0 Breaths/minute    PIP 0 cmH2O    IPAP 0     EPAP 0     pH, Temp Corrected 7.252 pH Units    pCO2, Temperature Corrected 55.7 (H) 35 - 48 mm Hg    pO2, Temperature Corrected 99.9 83 - 108 mm Hg     Note: In addition to lab results from this visit, the labs listed above may include labs taken at another facility or during a different encounter within the last 24 hours. Please correlate lab times with ED admission and discharge times for further clarification of the services performed during this visit.    XR Chest 1 View   Final Result   Impression:   Cardiomegaly with pulmonary vascular congestion and small bilateral pleural effusions.            Electronically Signed: Ton Busch MD     6/14/2024 4:38 AM EDT     Workstation ID: [id number]        Vitals:    06/14/24 0503 06/14/24 0504 06/14/24 0505 06/14/24 0530   BP:    141/48   BP Location:       Patient Position:       Pulse: 71 74  72   Resp:    20   Temp:       TempSrc:       SpO2: 97%   99%   Weight:   109 kg (240 lb)    Height:   180.3 cm (71\")      Medications   sodium chloride 0.9 % flush 10 mL (has no administration in time range)   ipratropium-albuterol (DUO-NEB) nebulizer solution 3 mL (3 mL Nebulization Given 6/14/24 3530)   methylPREDNISolone sodium succinate (SOLU-Medrol) injection 125 mg (125 mg Intravenous Given 6/14/24 0710)   furosemide (LASIX) injection 80 mg (80 mg " Intravenous Given 6/14/24 0500)     ECG/EMG Results (last 24 hours)       Procedure Component Value Units Date/Time    ECG 12 Lead ED Triage Standing Order; SOA [[id number]] Collected: 06/14/24 0411     Updated: 06/14/24 0414     QT Interval 394 ms      QTC Interval 439 ms     Narrative:      Test Reason : SOB  Blood Pressure :   */*   mmHG  Vent. Rate :  75 BPM     Atrial Rate :  75 BPM     P-R Int : 206 ms          QRS Dur : 102 ms      QT Int : 394 ms       P-R-T Axes :  62  18 110 degrees     QTc Int : 439 ms    Normal sinus rhythm  Possible Left atrial enlargement  ST & T wave abnormality, consider lateral ischemia  Abnormal ECG  No previous ECGs available  Confirmed by MD HURTADO KYLE ([id number]) on 6/14/2024 4:14:48 AM    Referred By: INA           Confirmed By: TANI HURTADO MD          ECG 12 Lead ED Triage Standing Order; SOA   Final Result   Test Reason : SOB   Blood Pressure :   */*   mmHG   Vent. Rate :  75 BPM     Atrial Rate :  75 BPM      P-R Int : 206 ms          QRS Dur : 102 ms       QT Int : 394 ms       P-R-T Axes :  62  18 110 degrees      QTc Int : 439 ms      Normal sinus rhythm   Possible Left atrial enlargement   ST & T wave abnormality, consider lateral ischemia   Abnormal ECG   No previous ECGs available   Confirmed by MD HURTADO KYLE ([id number]) on 6/14/2024 4:14:48 AM      Referred By: INA           Confirmed By: TANI HURTADO MD              No follow-up provider specified.       Medication List      No changes were made to your prescriptions during this visit.            Tani Hurtado MD  06/14/24 0609

## 2024-06-14 NOTE — Clinical Note
Level of Care: Telemetry [5]   Diagnosis: Respiratory failure [018395]   Admitting Physician: JOSIAH ARREOLA [[id number]]   Attending Physician: JOSIAH ARREOLA [[id number]]

## 2024-06-14 NOTE — CONSULTS
[facility] Cardiology  Initial Consult Note      Patient Identification:  Lee Saintmartin        68 y.o.        male  1955  [id number]       Date of Consultation:  06/14/24    Reason for Consultation:  CHF exacerbation w/ resp failure, elevated troponin, afib    PCP: Vadim Valadez MD  Primary cardiologist: Albert - 2021  Referring physician: Jake Rolle MD    History of Present Illness:     Lee Saintmartin is a 68 y.o. with a history of PAD with bilateral AKA, HTN, HLD, DM, CVA, dementia, and pulmonary fibrosis on 3 L/min oxygen at home who presented to the emergency department early this morning with worsening shortness of breath and wheezing with hypoxia.  The patient resides at a facility and has received most of his recent care locally at [facility] including a L AKA done in March of 2023.  He tells me today that he came to the hospital because he moved to the area from [place] and had nowhere else to go.  Review of available documentation suggest that has been in the same facility going back to 2021.  Currently he was unable to tell me what brought him into the emergency department last night.  He denies any chest pain or acute dyspnea at this time.    Past History:  Past Medical History:   Diagnosis Date    Anemia     Atrial fibrillation     Dementia     Diabetes mellitus     GERD (gastroesophageal reflux disease)     Hyperlipidemia     Hypertension     Major depressive disorder     Pulmonary fibrosis     PVD (peripheral vascular disease)     Renal disorder     Stroke      Past Surgical History:   Procedure Laterality Date    ABOVE KNEE AMPUTATION Bilateral      No Known Allergies  Social History     Socioeconomic History    Marital status:    Tobacco Use    Smoking status: Never   Substance and Sexual Activity    Alcohol use: Not Currently    Drug use: Never    Sexual activity: Defer     History reviewed. No pertinent family  history.  Medications:  Medications Prior to Admission   Medication Sig Dispense Refill Last Dose    amLODIPine (NORVASC) 10 MG tablet Take 1 tablet by mouth Daily.       apixaban (ELIQUIS) 5 MG tablet tablet Take 1 tablet by mouth 2 (Two) Times a Day.       atorvastatin (LIPITOR) 40 MG tablet Take 1 tablet by mouth Daily.       carvedilol (COREG) 25 MG tablet Take 1 tablet by mouth 2 (Two) Times a Day With Meals.       doxazosin (CARDURA) 8 MG tablet Take 1 tablet by mouth 2 (Two) Times a Day.       gabapentin (NEURONTIN) 100 MG capsule Take 1 capsule by mouth 4 (Four) Times a Day.       insulin glargine (LANTUS, SEMGLEE) 100 UNIT/ML injection Inject 15 Units under the skin into the appropriate area as directed Daily.       lamoTRIgine (LaMICtal) 25 MG tablet Take 1 tablet by mouth 3 times a day.       lisinopril (PRINIVIL,ZESTRIL) 40 MG tablet Take 1 tablet by mouth Daily.       NIFEdipine XL (PROCARDIA XL) 90 MG 24 hr tablet Take 1 tablet by mouth 2 (Two) Times a Day.       PARoxetine (PAXIL) 10 MG tablet Take 1 tablet by mouth Every Morning.        Current medications:  atorvastatin, 40 mg, Oral, Nightly  carvedilol, 25 mg, Oral, BID With Meals  insulin lispro, 3-14 Units, Subcutaneous, TID With Meals  lamoTRIgine, 25 mg, Oral, Daily  pantoprazole, 40 mg, Oral, Q AM  sodium chloride, 10 mL, Intravenous, Q12H      Current IV drips:       Review of Systems   Cardiovascular:  Negative for chest pain.   Respiratory:  Negative for shortness of breath.        Physical exam:  Temp:  [97.5 °F (36.4 °C)-97.7 °F (36.5 °C)] 97.5 °F (36.4 °C)  Heart Rate:  [70-80] 74  Resp:  [20-24] 21  BP: (118-142)/(48-75) 142/73  Flow (L/min):  [4-4.5] 4  Body mass index is 33.89 kg/m².    Gen: well developed, chronically ill appearing, lying in bed, comfortable appearing  HEENT: MMM, sclerae anicteric, conjunctivae normal  CV: regular rate, regular rhythm, no murmurs or rubs, normal S1, S2. 2+ radial and DP pulses  Pulm: RA, normal work  of breathing, no wheezes, rales, rhonchi  Abd: soft, nontender, nondistended  Ext: bilateral AKA, no dependent edema  Neuro: awake and alert, oriented to self only      Cardiac Testing:    ECG  (personally reviewed) NSR, LAE, non-specific ST and T wave changes    Telemetry:  (personally reviewed) SR, no arrhythmia alarms    ECHO:     3/15/23 - TTE - [facility]  Normal sized left ventricle. Normal left ventricular wall thickness.    Visually estimated ejection fraction 55% +/- 5%.    Abnormal systolic strain pattern.    Abnormal diastolic function with normal LV filling pressures (Grade 1    diastolic dysfunction).    No hemodynamically significant valvular heart disease.     10/1/2020 - TTE - [facility]    Normal LV size and function. LVEF 5-60%.     Normal right ventricular size and function.      Trace tricuspid regurgitation.     Stress Testing:      10/14/2020 - Stress MPI - [facility]   IMPRESSIONS    There are no significant reversible defects.     Normal LV perfusion study.      Lateral scar.    Lab Review:    Lab Results   Component Value Date    WBC 10.55 06/14/2024    HGB 8.9 (L) 06/14/2024    HCT 29.9 (L) 06/14/2024    MCV 84.5 06/14/2024     06/14/2024     Lab Results   Component Value Date    GLUCOSE 142 (H) 06/14/2024    BUN 26 (H) 06/14/2024    CREATININE 2.29 (H) 06/14/2024    EGFRIFNONA 40 (L) 02/23/2021    EGFRIFAFRI 46 (L) 02/23/2021    BCR 11.4 06/14/2024    K 5.1 06/14/2024    CO2 23.0 06/14/2024    CALCIUM 8.8 06/14/2024    ALBUMIN 3.7 06/14/2024    AST 19 06/14/2024    ALT 15 06/14/2024     Lab Results   Component Value Date    TROPONINT 117 (C) 06/14/2024    TROPONINT 121 (C) 06/14/2024    TROPONINT 117 (C) 06/14/2024     Lab Results   Component Value Date    PROBNP 4,208.0 (H) 06/14/2024     Lab Results   Component Value Date    HGBA1C 5.30 06/14/2024      Lab Results   Component Value Date    CHLPL 190 01/28/2021    TRIG 150 (H) 01/28/2021    HDL 27 (L) 01/28/2021      (H) 01/28/2021       Imaging:  All pertinent imaging studies were personally reviewed.    Assessment & Plan    Essential hypertension    Mixed hyperlipidemia    History of CVA (cerebrovascular accident)    Paroxysmal atrial fibrillation    COPD with acute exacerbation    Acute respiratory failure with hypoxia    DOMITILA (acute kidney injury)    Anemia    Lee Saintmartin is a 68 y.o. with a history of PAD with bilateral AKA, CVA, pulmonary fibrosis on 3L home oxygen, HTN, DM, dementia who was admitted via the ED overnight with reported dyspnea. Cardiology was consulted for multiple things including possible CHF, elevated troponin, and history of AF (pt currently in SR)    Elevated troponin   - no chest pain, no acute ischemic changes on ECG   - suspect non-ischemic myocardial injury in the setting of renal failure and acute on chronic hypoxemic respiratory failure. No chest pain concerning for ACS at this time    Concern for HF   - TTE pending   - IV furosemide given this am   - will follow BMP given CKD    Hx PAF   - currently SR   - AC held    Hx of PAD with bilateral AKA   - continue statin, AC/antiplatelets held on admission     Thank you for allowing us to share in the care of Lee Saintmartin J. Sandeep Courtney MD  06/14/24   10:16 EDT

## 2024-06-14 NOTE — LETTER
EMS Transport Request  For use at [facility], [place], [place], [place], and [place] only   Patient Name: Lee Saintmartin : 1955   Weight:98.2 kg (216 lb 9.6 oz) Pick-up Location: [facility] BLS/ALS: BLS/ALS: BLS   Insurance: MEDICARE Auth End Date:    Pre-Cert #: D/C Summary complete:    Destination: Other [facility]   Contact Precautions: None contact precautions    Equipment (O2, Fluids, etc.): O2, settings oxygen at 2 liters   Arrive By Date/Time: later today Stretcher/WC: Stretcher   CM Requesting: Liliana Benz RN Ext: [phone]   Notes/Medical Necessity:      ______________________________________________________________________    *Only 2 patient bags OR 1 carry-on size bag are permitted.  Wheelchairs and walkers CANNOT transported with the patient. Acknowledge: Yes

## 2024-06-14 NOTE — CASE MANAGEMENT/SOCIAL WORK
Discharge Planning Assessment  [facility]     Patient Name: Lee Saintmartin  MRN: [id number]  Today's Date: 6/14/2024    Admit Date: 6/14/2024    Plan: IDP   Discharge Needs Assessment       Row Name 06/14/24 1537       Living Environment    People in Home facility resident    Current Living Arrangements extended care facility    Duration at Residence 4 years    Potentially Unsafe Housing Conditions none    In the past 12 months has the electric, gas, oil, or water company threatened to shut off services in your home? No    Primary Care Provided by other (see comments)  staff at [facility]    Provides Primary Care For no one, unable/limited ability to care for self    Family Caregiver if Needed other (see comments)  jose bynum (Relative)  [phone] (Mobile)    Quality of Family Relationships supportive    Able to Return to Prior Arrangements yes    Living Arrangement Comments return to [facility]       Resource/Environmental Concerns    Resource/Environmental Concerns none    Transportation Concerns none       Transportation Needs    In the past 12 months, has lack of transportation kept you from medical appointments or from getting medications? no    In the past 12 months, has lack of transportation kept you from meetings, work, or from getting things needed for daily living? No       Food Insecurity    Within the past 12 months, you worried that your food would run out before you got the money to buy more. Never true    Within the past 12 months, the food you bought just didn't last and you didn't have money to get more. Never true       Transition Planning    Patient/Family Anticipates Transition to home    Patient/Family Anticipated Services at Transition     Transportation Anticipated health plan transportation       Discharge Needs Assessment    Readmission Within the Last 30 Days no previous admission in last 30 days    Equipment Currently Used at Home hospital  bed;oxygen;wheelchair    Concerns to be Addressed discharge planning    Anticipated Changes Related to Illness inability to care for self    Equipment Needed After Discharge none    Current Discharge Risk chronically ill;dependent with mobility/activities of daily living                   Discharge Plan       Row Name 06/14/24 1539       Plan    Plan IDP    Patient/Family in Agreement with Plan yes    Plan Comments I attempted to speak with the patient at the bedside. He had a Bipap on and was resting. I did not wake, but observed respirations and tele monitor. I called his POA Ammy Garber. She stated that he lives at [facility]. He is dependent for care at the facility. He has a hospital bed, wheelchair and O2 at 3L at the facility. I verified his bed with Praveena at [facility]. He does have a bed hold. He can return when medically ready. He does not use a Bipap at the facility. CM would need to arrange with a [employer] company if this will be needed at [facility]. Information for return: RN will need to call report to [phone] and fax the discharge summary to [phone]. [facility]. Praveena will need to be notified or return at [phone]. Ammy does plan for the patient to return to his facility at discharge. She confirmed his insurance is Medicare A&B with [place] Medicaid secondary. PCP is Vadim Valadez MD. CM following.    Final Discharge Disposition Code 01 - home or self-care                  Continued Care and Services - Admitted Since 6/14/2024    No active coordination exists for this encounter.          Demographic Summary    No documentation.                  Functional Status       Row Name 06/14/24 1536       Functional Status    Usual Activity Tolerance poor    Current Activity Tolerance other (see comments)  see RN/therapy notes       Physical Activity    On average, how many days per week do you engage in moderate to strenuous exercise  (like a brisk walk)? 0 days    On average, how many minutes do you engage in exercise at this level? 0 min    Number of minutes of exercise per week 0       Assessment of Health Literacy    How often do you have someone help you read hospital materials? Sometimes    How often do you have problems learning about your medical condition because of difficulty understanding written information? Sometimes    How often do you have a problem understanding what is told to you about your medical condition? Sometimes    How confident are you filling out medical forms by yourself? Not at all    Health Literacy Low       Functional Status, IADL    Medications completely dependent    Meal Preparation completely dependent    Housekeeping completely dependent    Laundry completely dependent    Shopping completely dependent       Mental Status Summary    Recent Changes in Mental Status/Cognitive Functioning unable to assess       Employment/    Employment Status disabled                   Psychosocial    No documentation.                  Abuse/Neglect    No documentation.                  Legal    No documentation.                  Substance Abuse    No documentation.                  Patient Forms    No documentation.                     Adeline Pelayo RN

## 2024-06-14 NOTE — H&P
[facility] Medicine Services  HISTORY AND PHYSICAL    Patient Name: Lee Saintmartin  : 1955  MRN: [id number]  Primary Care Physician: Vadim Valadez MD  Date of admission: 2024    Subjective   Subjective     Chief Complaint:  SOB    HPI:  Lee Saintmartin is a 68 y.o. male with a history of parox atrial fib (on Eliquis), CHF, COPD, HTN, HLD, T2DM, hx CVA, PVD, and anxiety/depression who presents to [facility] ED from an assisted living facility for complaint of shortness of breath. He states that this began yesterday morning and seems to have worsened since onset. Its reported by living facility facility that patient was found to be hypoxic, so EMS was called and he was brought here for further evaluation. He denies fever, chest pain, abdominal pain, nausea or vomiting.         Review of Systems   No f/c, no chest pain  Limited due to dementia          Personal History     Past Medical History:   Diagnosis Date    Anemia     Atrial fibrillation     Dementia     Diabetes mellitus     GERD (gastroesophageal reflux disease)     Hyperlipidemia     Hypertension     Major depressive disorder     Pulmonary fibrosis     PVD (peripheral vascular disease)     Renal disorder     Stroke              Past Surgical History:   Procedure Laterality Date    ABOVE KNEE AMPUTATION Bilateral        Family History:  family history is not on file.     Social History:  reports that he has never smoked. He does not have any smokeless tobacco history on file. He reports that he does not currently use alcohol. He reports that he does not use drugs.  Social History     Social History Narrative    Not on file       Medications:  NIFEdipine XL, PARoxetine, amLODIPine, apixaban, atorvastatin, carvedilol, doxazosin, gabapentin, insulin glargine, lamoTRIgine, and lisinopril    No Known Allergies    Objective   Objective     Vital Signs:   Temp:  [97.5 °F (36.4 °C)-97.7 °F (36.5 °C)] 97.5 °F (36.4  °C)  Heart Rate:  [70-80] 70  Resp:  [20-24] 21  BP: (118-142)/(48-75) 142/73  Flow (L/min):  [4-4.5] 4    Physical Exam   Constitutional:Alert,oriented to year, not month; answers simple questions appropriately; on bipap during my visit, no respiratory distress  Psych:Normal/appropriate affect  HEENT:NCAT, oropharynx clear  Neck: neck supple, full range of motion  Neuro: Face symmetric, speech clear, perhaps slightly decreased left  compared to right  Cardiac: rrr No pretibial pitting edema  Resp: decreased air movement bilateral bases (no overt wheezes noted)  GI: abd soft, nontender  Skin: No extremity rash  Musculoskeletal/extremities: bilateral AKA's well healed    Result Review:  I have personally reviewed the results from the time of this admission to 6/14/2024 08:58 EDT and agree with these findings:  [x]  Laboratory list / accordion  []  Microbiology  [x]  Radiology  [x]  EKG/Telemetry   []  Cardiology/Vascular   []  Pathology  [x]  Old records  []  Other:  Most notable findings include: see attending attestation and below    LAB RESULTS:      Lab 06/14/24  0710 06/14/24 0409   WBC 10.55 10.60   HEMOGLOBIN 8.9* 8.9*   HEMATOCRIT 29.9* 30.5*   PLATELETS 140 137*   NEUTROS ABS 9.96* 9.43*   IMMATURE GRANS (ABS) 0.06*  --    LYMPHS ABS 0.30*  --    MONOS ABS 0.12  --    EOS ABS 0.08 0.42*   MCV 84.5 84.7         Lab 06/14/24  0710 06/14/24  0409   SODIUM 141 139   POTASSIUM 5.1 5.1   CHLORIDE 108* 106   CO2 23.0 25.0   ANION GAP 10.0 8.0   BUN 26* 24*   CREATININE 2.29* 2.24*   EGFR 30.0* 30.8*   GLUCOSE 142* 129*   CALCIUM 8.8 8.6   HEMOGLOBIN A1C  --  5.30         Lab 06/14/24  0409   TOTAL PROTEIN 7.5   ALBUMIN 3.7   GLOBULIN 3.8   ALT (SGPT) 15   AST (SGOT) 19   BILIRUBIN 0.4   ALK PHOS 102         Lab 06/14/24  0710 06/14/24 0409   PROBNP  --  4,208.0*   HSTROP T 121* 117*                 Lab 06/14/24  0433   PH, ARTERIAL 7.252*   PCO2, ARTERIAL 55.7*   PO2 ART 99.9   FIO2 36   HCO3 ART 24.5   BASE  EXCESS ART -2.9*   CARBOXYHEMOGLOBIN 1.5     Brief Urine Lab Results       None          Microbiology Results (last 10 days)       Procedure Component Value - Date/Time    COVID PRE-OP / PRE-PROCEDURE SCREENING ORDER (NO ISOLATION) - Swab, Nasopharynx [[id number]]  (Normal) Collected: 06/14/24 0416    Lab Status: Final result Specimen: Swab from Nasopharynx Updated: 06/14/24 0446    Narrative:      The following orders were created for panel order COVID PRE-OP / PRE-PROCEDURE SCREENING ORDER (NO ISOLATION) - Swab, Nasopharynx.  Procedure                               Abnormality         Status                     ---------                               -----------         ------                     COVID-19 and FLU A/B PCR...[[id number]]  Normal              Final result                 Please view results for these tests on the individual orders.    COVID-19 and FLU A/B PCR, 1 HR TAT - Swab, Nasopharynx [[id number]]  (Normal) Collected: 06/14/24 0416    Lab Status: Final result Specimen: Swab from Nasopharynx Updated: 06/14/24 0446     COVID19 Not Detected     Influenza A PCR Not Detected     Influenza B PCR Not Detected    Narrative:      Fact sheet for providers: https://www.fda.gov/media/848555/download    Fact sheet for patients: https://www.fda.gov/media/959799/download    Test performed by PCR.            XR Chest 1 View    Result Date: 6/14/2024  XR CHEST 1 VW Date of Exam: 6/14/2024 4:08 AM EDT Indication: SOA triage protocol Comparison: None available. Findings: The heart is enlarged. There is pulmonary vascular congestion. There are small bilateral pleural effusions. There is bilateral basilar atelectasis.     Impression: Impression: Cardiomegaly with pulmonary vascular congestion and small bilateral pleural effusions. Electronically Signed: Ton Busch MD  6/14/2024 4:38 AM EDT  Workstation ID: [id number]         Assessment & Plan   Assessment & Plan       Essential hypertension    Mixed hyperlipidemia     "History of CVA (cerebrovascular accident)    Paroxysmal atrial fibrillation    COPD with acute exacerbation    Acute respiratory failure with hypoxia    DOMITILA (acute kidney injury)    Anemia      Lee Saintmartin is a 68 y.o. 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 In ED had elevated troponin 117 (repeat 121), elevated probnp, cxr c/w vascular congestion and small pleural effusions. Was given lasix 80mg iv and solumedrol 125mg (due to some concerns for bronchospasm), required bipap therapy in ED due to hypoxia and increased work of breathing at time of presentation. Was admitted to hospitalist service.    Acute Respiratory Failure w/ Hypoxia  Acute DHF   Elevated & \"flat\" troponins, likely supply demand due to decompensated chf  Parox afib  HTN  HL  -previous echo 3/15/23 (outside facility): w/ EF 55, diastolic dysfunction  -not typically on diuretics per med rec  -Patient found to be hypoxic on arrival. Has been placed on BiPAP  -CXR tonight shows cardiomegaly with pulmonary vascular congestion and small bilateral pleural effusions.   -Received Lasix 80mg iv n the ED.   -Cards consulted: echo ordered (pending); further diuresis per cards. I will restart home coreg 25mg bid, but hold other bp meds currently, apreciate further bp med titration per cards  -since ct head negative, restarting asa 81mg and eliquis, continue statin  -I currently do not appreciate any bronchospasm/wheezes (and no previous hx copd per niece or patient); will change duonebs to q4h prn and hold further steroids for now  -currently on bipap but has been " weaned to 35% fio2, will attempt to wean to nasal canula  -confirmed w/ niece (poa) patient is full code  -addendum @ 16:41: in reviewing cardiology's note, there is mention of pulmonary fibrosis and chronic 3L o2 dependence. Patient's dementia precludes accurate history, but I did speak with patient's niece/poa via phone today and she denied any prior pulmonary illnesses or chronic oxygen dependence. Will need to investigate further tomorrow; perhaps nursing facility sent more information and on hard chart?    Encephalopathy  Vascular dementia  Hx previous CVA  Hx bipolar d/o  -ct head negative (restarted asa & eliquis)  -u/a negative      T2DM (A1c 5.3)  -Blood glucose tonight 129  -Check A1C  -Fingerstick achs. SSI currently and titrate prn      CKD 3-4 (baseline cr 2.3-2.6 per outside labs)  -Cr 2.24.   -current stable from previous renal fxn; monitor as diurese    PVD  Hx previous bilateral AKA (due to infection)      Anemia  -Hgb 8.9 Hct 30.5. ; repeat hgb stable at 8.9  -Appears chronic  -Check Iron studies, B12, folate, ferratin, and retic count.   Monitor for overt bleeding          DVT prophylaxis:  Eliquis     CODE STATUS:  full code (confirmed w/ niece poa via phone 6/14/24)  Code Status (Patient has no pulse and is not breathing): CPR (Attempt to Resuscitate)  Medical Interventions (Patient has pulse or is breathing): Full Support      Expected DischargeTBD       This note has been completed as part of a split-shared workflow.     Signature: Electronically signed by Shaggy Liu PA-C, 06/14/24, 6:08 AM EDT          Attending   Admission Attestation       I have performed an independent face-to-face diagnostic evaluation including performing an independent physical examination.  I approve of the documented plan of care above that was reviewed and developed with the advanced practice clinician (APC) and take responsibility for that plan along with its associated risks.  I have updated the HPI  as appropriate.    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 In ED had elevated troponin 117 (repeat 121), elevated probnp, cxr c/w vascular congestion and small pleural effusions. Was given lasix 80mg iv and solumedrol 125mg (due to some concerns for bronchospasm), required bipap therapy in ED due to hypoxia and increased work of breathing at time of presentation. Was admitted to hospitalist service.    Attending Physical Exam:  Temp:  [97.5 °F (36.4 °C)-97.7 °F (36.5 °C)] 97.5 °F (36.4 °C)  Heart Rate:  [70-80] 70  Resp:  [20-24] 21  BP: (118-142)/(48-75) 142/73  Flow (L/min):  [4-4.5] 4    Constitutional:Alert,oriented to year, not month; answers simple questions appropriately; on bipap during my visit, no respiratory distress  Psych:Normal/appropriate affect  HEENT:NCAT, oropharynx clear  Neck: neck supple, full range of motion  Neuro: Face symmetric, speech clear, perhaps slightly decreased left  compared to right  Cardiac: rrr No pretibial pitting edema  Resp: decreased air movement bilateral bases (no overt wheezes noted)  GI: abd soft, nontender  Skin: No extremity rash  Musculoskeletal/extremities: bilateral AKA's well healed        Result Review:  I have personally reviewed the results from the time of this admission to 6/14/2024 08:58 EDT and agree with these findings:  [x]  Laboratory list / accordion  []  Microbiology  [x]  Radiology  [x]  EKG/Telemetry   []  Cardiology/Vascular   []  Pathology  []  Old records  []  Other:  Most notable findings include: cxr w/ vascular congestion and effusions; elevated  probnp, elevated troponin, creatinine 2.2    Assessment and Plan:    See assessment and plan documented by APC above and updated/edited by me as appropriate.    Jake Rolle MD  06/14/24

## 2024-06-15 ENCOUNTER — APPOINTMENT (OUTPATIENT)
Dept: CARDIOLOGY | Facility: HOSPITAL | Age: 69
End: 2024-06-15
Payer: MEDICARE

## 2024-06-15 LAB
ALBUMIN SERPL-MCNC: 3.6 G/DL (ref 3.5–5.2)
ALBUMIN/GLOB SERPL: 0.9 G/DL
ALP SERPL-CCNC: 103 U/L (ref 39–117)
ALT SERPL W P-5'-P-CCNC: 13 U/L (ref 1–41)
ANION GAP SERPL CALCULATED.3IONS-SCNC: 8 MMOL/L (ref 5–15)
ASCENDING AORTA: 3.2 CM
AST SERPL-CCNC: 14 U/L (ref 1–40)
BH CV ECHO MEAS - AO MAX PG: 6 MMHG
BH CV ECHO MEAS - AO MEAN PG: 3 MMHG
BH CV ECHO MEAS - AO ROOT DIAM: 2.7 CM
BH CV ECHO MEAS - AO V2 MAX: 122 CM/SEC
BH CV ECHO MEAS - AO V2 VTI: 22.5 CM
BH CV ECHO MEAS - AVA(I,D): 2.46 CM2
BH CV ECHO MEAS - EDV(CUBED): 140.6 ML
BH CV ECHO MEAS - EDV(MOD-SP4): 73.9 ML
BH CV ECHO MEAS - EF(MOD-SP4): 46.5 %
BH CV ECHO MEAS - ESV(CUBED): 54.9 ML
BH CV ECHO MEAS - ESV(MOD-SP4): 39.5 ML
BH CV ECHO MEAS - FS: 26.9 %
BH CV ECHO MEAS - IVS/LVPW: 0.92 CM
BH CV ECHO MEAS - IVSD: 1.15 CM
BH CV ECHO MEAS - LA DIMENSION: 3.7 CM
BH CV ECHO MEAS - LAT PEAK E' VEL: 8.9 CM/SEC
BH CV ECHO MEAS - LV DIASTOLIC VOL/BSA (35-75): 33.4 CM2
BH CV ECHO MEAS - LV MASS(C)D: 248.8 GRAMS
BH CV ECHO MEAS - LV MAX PG: 3 MMHG
BH CV ECHO MEAS - LV MEAN PG: 1 MMHG
BH CV ECHO MEAS - LV SYSTOLIC VOL/BSA (12-30): 17.9 CM2
BH CV ECHO MEAS - LV V1 MAX: 86.2 CM/SEC
BH CV ECHO MEAS - LV V1 VTI: 16 CM
BH CV ECHO MEAS - LVIDD: 5.2 CM
BH CV ECHO MEAS - LVIDS: 3.8 CM
BH CV ECHO MEAS - LVOT AREA: 3.5 CM2
BH CV ECHO MEAS - LVOT DIAM: 2.1 CM
BH CV ECHO MEAS - LVPWD: 1.25 CM
BH CV ECHO MEAS - MED PEAK E' VEL: 6.2 CM/SEC
BH CV ECHO MEAS - MV A MAX VEL: 135 CM/SEC
BH CV ECHO MEAS - MV DEC SLOPE: 971 CM/SEC2
BH CV ECHO MEAS - MV DEC TIME: 0.15 SEC
BH CV ECHO MEAS - MV E MAX VEL: 144 CM/SEC
BH CV ECHO MEAS - MV E/A: 1.07
BH CV ECHO MEAS - MV MAX PG: 9.9 MMHG
BH CV ECHO MEAS - MV MEAN PG: 4 MMHG
BH CV ECHO MEAS - MV V2 VTI: 31.7 CM
BH CV ECHO MEAS - MVA(VTI): 1.75 CM2
BH CV ECHO MEAS - PA ACC TIME: 0.1 SEC
BH CV ECHO MEAS - PA V2 MAX: 117 CM/SEC
BH CV ECHO MEAS - SV(LVOT): 55.4 ML
BH CV ECHO MEAS - SV(MOD-SP4): 34.4 ML
BH CV ECHO MEAS - SVI(LVOT): 25.1 ML/M2
BH CV ECHO MEAS - SVI(MOD-SP4): 15.6 ML/M2
BH CV ECHO MEASUREMENTS AVERAGE E/E' RATIO: 19.07
BH CV VAS BP RIGHT ARM: NORMAL MMHG
BH CV XLRA - RV BASE: 3.5 CM
BH CV XLRA - RV LENGTH: 9.7 CM
BH CV XLRA - RV MID: 3 CM
BILIRUB SERPL-MCNC: 0.3 MG/DL (ref 0–1.2)
BUN SERPL-MCNC: 31 MG/DL (ref 8–23)
BUN/CREAT SERPL: 13.4 (ref 7–25)
CALCIUM SPEC-SCNC: 8.7 MG/DL (ref 8.6–10.5)
CHLORIDE SERPL-SCNC: 108 MMOL/L (ref 98–107)
CO2 SERPL-SCNC: 25 MMOL/L (ref 22–29)
CREAT SERPL-MCNC: 2.32 MG/DL (ref 0.76–1.27)
DEPRECATED RDW RBC AUTO: 46.8 FL (ref 37–54)
EGFRCR SERPLBLD CKD-EPI 2021: 29.5 ML/MIN/1.73
ERYTHROCYTE [DISTWIDTH] IN BLOOD BY AUTOMATED COUNT: 15.6 % (ref 12.3–15.4)
GLOBULIN UR ELPH-MCNC: 3.9 GM/DL
GLUCOSE BLDC GLUCOMTR-MCNC: 110 MG/DL (ref 70–130)
GLUCOSE BLDC GLUCOMTR-MCNC: 112 MG/DL (ref 70–130)
GLUCOSE BLDC GLUCOMTR-MCNC: 118 MG/DL (ref 70–130)
GLUCOSE BLDC GLUCOMTR-MCNC: 120 MG/DL (ref 70–130)
GLUCOSE SERPL-MCNC: 129 MG/DL (ref 65–99)
HCT VFR BLD AUTO: 28.7 % (ref 37.5–51)
HGB BLD-MCNC: 8.9 G/DL (ref 13–17.7)
IVRT: 85 MS
MAGNESIUM SERPL-MCNC: 1.6 MG/DL (ref 1.6–2.4)
MCH RBC QN AUTO: 25.5 PG (ref 26.6–33)
MCHC RBC AUTO-ENTMCNC: 31 G/DL (ref 31.5–35.7)
MCV RBC AUTO: 82.2 FL (ref 79–97)
PLATELET # BLD AUTO: 132 10*3/MM3 (ref 140–450)
PMV BLD AUTO: 13.1 FL (ref 6–12)
POTASSIUM SERPL-SCNC: 5.4 MMOL/L (ref 3.5–5.2)
PROT SERPL-MCNC: 7.5 G/DL (ref 6–8.5)
RBC # BLD AUTO: 3.49 10*6/MM3 (ref 4.14–5.8)
SODIUM SERPL-SCNC: 141 MMOL/L (ref 136–145)
WBC NRBC COR # BLD AUTO: 14.73 10*3/MM3 (ref 3.4–10.8)

## 2024-06-15 PROCEDURE — 83735 ASSAY OF MAGNESIUM: CPT | Performed by: INTERNAL MEDICINE

## 2024-06-15 PROCEDURE — 94799 UNLISTED PULMONARY SVC/PX: CPT

## 2024-06-15 PROCEDURE — 94660 CPAP INITIATION&MGMT: CPT

## 2024-06-15 PROCEDURE — 94664 DEMO&/EVAL PT USE INHALER: CPT

## 2024-06-15 PROCEDURE — 82948 REAGENT STRIP/BLOOD GLUCOSE: CPT

## 2024-06-15 PROCEDURE — 85027 COMPLETE CBC AUTOMATED: CPT | Performed by: INTERNAL MEDICINE

## 2024-06-15 PROCEDURE — 99232 SBSQ HOSP IP/OBS MODERATE 35: CPT | Performed by: INTERNAL MEDICINE

## 2024-06-15 PROCEDURE — 93306 TTE W/DOPPLER COMPLETE: CPT

## 2024-06-15 PROCEDURE — 80053 COMPREHEN METABOLIC PANEL: CPT | Performed by: INTERNAL MEDICINE

## 2024-06-15 PROCEDURE — 25010000002 SULFUR HEXAFLUORIDE MICROSPH 60.7-25 MG RECONSTITUTED SUSPENSION: Performed by: PHYSICIAN ASSISTANT

## 2024-06-15 RX ORDER — IPRATROPIUM BROMIDE AND ALBUTEROL SULFATE 2.5; .5 MG/3ML; MG/3ML
3 SOLUTION RESPIRATORY (INHALATION)
Status: DISCONTINUED | OUTPATIENT
Start: 2024-06-15 | End: 2024-06-19 | Stop reason: HOSPADM

## 2024-06-15 RX ORDER — BUDESONIDE 0.5 MG/2ML
0.5 INHALANT ORAL
Status: DISCONTINUED | OUTPATIENT
Start: 2024-06-15 | End: 2024-06-19 | Stop reason: HOSPADM

## 2024-06-15 RX ADMIN — APIXABAN 5 MG: 5 TABLET, FILM COATED ORAL at 20:56

## 2024-06-15 RX ADMIN — IPRATROPIUM BROMIDE AND ALBUTEROL SULFATE 3 ML: 2.5; .5 SOLUTION RESPIRATORY (INHALATION) at 20:14

## 2024-06-15 RX ADMIN — Medication 5 MG: at 23:40

## 2024-06-15 RX ADMIN — SENNOSIDES AND DOCUSATE SODIUM 2 TABLET: 50; 8.6 TABLET ORAL at 08:41

## 2024-06-15 RX ADMIN — LAMOTRIGINE 25 MG: 25 TABLET ORAL at 08:42

## 2024-06-15 RX ADMIN — CARVEDILOL 25 MG: 12.5 TABLET, FILM COATED ORAL at 08:41

## 2024-06-15 RX ADMIN — BUDESONIDE 0.5 MG: 0.5 SUSPENSION RESPIRATORY (INHALATION) at 20:14

## 2024-06-15 RX ADMIN — ATORVASTATIN CALCIUM 40 MG: 40 TABLET, FILM COATED ORAL at 20:56

## 2024-06-15 RX ADMIN — CARVEDILOL 25 MG: 12.5 TABLET, FILM COATED ORAL at 18:57

## 2024-06-15 RX ADMIN — ASPIRIN 81 MG CHEWABLE TABLET 81 MG: 81 TABLET CHEWABLE at 08:42

## 2024-06-15 RX ADMIN — APIXABAN 5 MG: 5 TABLET, FILM COATED ORAL at 08:41

## 2024-06-15 RX ADMIN — POLYETHYLENE GLYCOL 3350 17 G: 17 POWDER, FOR SOLUTION ORAL at 00:32

## 2024-06-15 RX ADMIN — Medication 10 ML: at 08:49

## 2024-06-15 RX ADMIN — PANTOPRAZOLE SODIUM 40 MG: 40 TABLET, DELAYED RELEASE ORAL at 05:59

## 2024-06-15 RX ADMIN — SENNOSIDES AND DOCUSATE SODIUM 2 TABLET: 50; 8.6 TABLET ORAL at 00:32

## 2024-06-15 RX ADMIN — Medication 5 MG: at 00:33

## 2024-06-15 RX ADMIN — SULFUR HEXAFLUORIDE 2 ML: KIT at 10:03

## 2024-06-15 NOTE — PROGRESS NOTES
" [facility] - Progress Note    Lee Saintmartin  1955  [id number]    06/15/24, 10:08 EDT      Chief Complaint: Following for SOA/CHF exacerbation with respiratory failure    Subjective:   Awake in bed.  Pleasant in conversation but did not answer questions appropriately.    Review of Systems:  Pertinent positives are listed above and in physical exam.  All others have been reviewed and are negative.    apixaban, 5 mg, Oral, Q12H  aspirin, 81 mg, Oral, Daily  atorvastatin, 40 mg, Oral, Nightly  carvedilol, 25 mg, Oral, BID With Meals  insulin lispro, 3-14 Units, Subcutaneous, TID With Meals  iopamidol, 85 mL, Intravenous, Once in imaging  lamoTRIgine, 25 mg, Oral, Daily  pantoprazole, 40 mg, Oral, Q AM  senna-docusate sodium, 2 tablet, Oral, BID  sodium chloride, 10 mL, Intravenous, Q12H        Objective:  Vitals:   height is 180.3 cm (70.98\") and weight is 102 kg (224 lb). His oral temperature is 98.9 °F (37.2 °C). His blood pressure is 135/82 and his pulse is 78. His respiration is 18 and oxygen saturation is 100%.     Intake/Output Summary (Last 24 hours) at 6/15/2024 1008  Last data filed at 6/15/2024 0100  Gross per 24 hour   Intake 620 ml   Output 1350 ml   Net -730 ml       Physical Exam:  General:  WN, NAD, A and O x3.  CV:  Normal S1,S2. No murmur, rub, or gallop.  Resp:  CTA Chava, equal, nonlabored.  Currently on RA  Abd:  Soft, + BS, no organomegaly. Nontender to palpation.  Extrem: Bilateral AKA, no edema         Results from last 7 days   Lab Units 06/15/24  0523   WBC 10*3/mm3 14.73*   HEMOGLOBIN g/dL 8.9*   HEMATOCRIT % 28.7*   PLATELETS 10*3/mm3 132*     Results from last 7 days   Lab Units 06/15/24  0523   SODIUM mmol/L 141   POTASSIUM mmol/L 5.4*   CHLORIDE mmol/L 108*   CO2 mmol/L 25.0   BUN mg/dL 31*   CREATININE mg/dL 2.32*   CALCIUM mg/dL 8.7   BILIRUBIN mg/dL 0.3   ALK PHOS U/L 103   ALT (SGPT) U/L 13   AST (SGOT) U/L 14   GLUCOSE mg/dL 129*                 Results from last 7 " days   Lab Units 06/14/24  0409   PROBNP pg/mL 4,208.0*       Tele: NSR    Assessment & Plan    Essential hypertension    Mixed hyperlipidemia    History of CVA (cerebrovascular accident)    Paroxysmal atrial fibrillation    COPD with acute exacerbation    Acute respiratory failure with hypoxia    DOMITILA (acute kidney injury)    Anemia     Lee Saintmartin is a 68 y.o. with a history of PAD with bilateral AKA, CVA, pulmonary fibrosis on 3L home oxygen, HTN, DM, dementia who was admitted via the ED overnight with reported dyspnea. Cardiology was consulted for multiple things including possible CHF, elevated troponin, and history of AF (pt currently in SR)     Elevated troponin   - no chest pain, no acute ischemic changes on ECG   - suspect non-ischemic myocardial injury in the setting of renal failure and acute on chronic hypoxemic respiratory failure. No chest pain concerning for ACS at this time     Concern for HF   - TTE ordered, will review   - IV furosemide given 6/15/2024.  Does not appear to be volume overloaded at this time   - Monitor renal function with CKD.  Creatinine 2.32 today     Hx PAF   - currently SR   - AC held with anemia, restart when stable as no cardiac procedures anticipated.     Hx of PAD with bilateral AKA   - continue statin,   - AC/antiplatelets held on admission     DM2    Encephalopathy, vascular dementia, previous CVA   - CT head negative,    - restart ASA, Eliquis    From a cardiac standpoint, patient appears to be stable today.  Reports no complaints of chest pain or dyspnea.  Still using 3 LNC to maintain sats.  Patient reports he does not use oxygen at nursing home although it is documented otherwise.  Case management following.  Cardiology will see again on Monday    I discussed the patient's findings and my recommendations with the patient, any present family members, and the nursing staff.  Hector Love MD saw and examined patient, verified hx and PE, read all radiographic  studies, reviewed labs and micro data, and formulated dx, plan for treatment and all medical decision making.      Jessica Patterson PA-C  06/15/24, 10:08 EDT

## 2024-06-15 NOTE — PROGRESS NOTES
[facility] Medicine Services  PROGRESS NOTE    Patient Name: Lee Saintmartin  : 1955  MRN: [id number]    Date of Admission: 2024  Primary Care Physician: Vadim Valadez MD    Subjective   Subjective     CC: shortness of breath      HPI:  No complaints today.  Not sure why he is in the hospital.  Denies shortness of breath or orthopnea      Objective   Objective     Vital Signs:   Temp:  [98.1 °F (36.7 °C)-99.6 °F (37.6 °C)] 98.7 °F (37.1 °C)  Heart Rate:  [78-92] 81  Resp:  [18-20] 18  BP: (135-159)/(80-89) 139/80  Flow (L/min):  [3-4] 3     Physical Exam:  Non toxic, in bed  MM moist  RRR  Breath sounds grossly clear bilaterally  Abd soft, NT  Bilateral AKAs  Alert, confused, speech clear, talkative  Normal affect  + Edema    Results Reviewed:  LAB RESULTS:      Lab 06/15/24  0524  0710 24   WBC 14.73* 10.55 10.60   HEMOGLOBIN 8.9* 8.9* 8.9*   HEMATOCRIT 28.7* 29.9* 30.5*   PLATELETS 132* 140 137*   NEUTROS ABS  --  9.96* 9.43*   IMMATURE GRANS (ABS)  --  0.06*  --    LYMPHS ABS  --  0.30*  --    MONOS ABS  --  0.12  --    EOS ABS  --  0.08 0.42*   MCV 82.2 84.5 84.7         Lab 06/15/24  0524  0710 24   SODIUM 141 141 139   POTASSIUM 5.4* 5.1 5.1   CHLORIDE 108* 108* 106   CO2 25.0 23.0 25.0   ANION GAP 8.0 10.0 8.0   BUN 31* 26* 24*   CREATININE 2.32* 2.29* 2.24*   EGFR 29.5* 30.0* 30.8*   GLUCOSE 129* 142* 129*   CALCIUM 8.7 8.8 8.6   MAGNESIUM 1.6  --   --    HEMOGLOBIN A1C  --   --  5.30         Lab 06/15/24  0524   TOTAL PROTEIN 7.5 7.5   ALBUMIN 3.6 3.7   GLOBULIN 3.9 3.8   ALT (SGPT) 13 15   AST (SGOT) 14 19   BILIRUBIN 0.3 0.4   ALK PHOS 103 102         Lab 24  0921 24  0710 06/14/24  040   PROBNP  --   --  4,208.0*   HSTROP T 117* 121* 117*             Lab 24  0710 06/14/24  0409   IRON  --  39*   IRON SATURATION (TSAT)  --  15*   TIBC  --  265*   TRANSFERRIN  --  178*   FERRITIN  --   115.10   FOLATE >20.00  --    VITAMIN B 12 727  --          Lab 06/14/24  0433   PH, ARTERIAL 7.252*   PCO2, ARTERIAL 55.7*   PO2 ART 99.9   FIO2 36   HCO3 ART 24.5   BASE EXCESS ART -2.9*   CARBOXYHEMOGLOBIN 1.5     Brief Urine Lab Results  (Last result in the past 365 days)        Color   Clarity   Blood   Leuk Est   Nitrite   Protein   CREAT   Urine HCG        06/14/24 0918 Yellow   Clear   Negative   Trace   Negative   Negative                   Microbiology Results Abnormal       Procedure Component Value - Date/Time    COVID PRE-OP / PRE-PROCEDURE SCREENING ORDER (NO ISOLATION) - Swab, Nasopharynx [[id number]]  (Normal) Collected: 06/14/24 0416    Lab Status: Final result Specimen: Swab from Nasopharynx Updated: 06/14/24 0446    Narrative:      The following orders were created for panel order COVID PRE-OP / PRE-PROCEDURE SCREENING ORDER (NO ISOLATION) - Swab, Nasopharynx.  Procedure                               Abnormality         Status                     ---------                               -----------         ------                     COVID-19 and FLU A/B PCR...[[id number]]  Normal              Final result                 Please view results for these tests on the individual orders.    COVID-19 and FLU A/B PCR, 1 HR TAT - Swab, Nasopharynx [[id number]]  (Normal) Collected: 06/14/24 0416    Lab Status: Final result Specimen: Swab from Nasopharynx Updated: 06/14/24 0446     COVID19 Not Detected     Influenza A PCR Not Detected     Influenza B PCR Not Detected    Narrative:      Fact sheet for providers: https://www.fda.gov/media/910329/download    Fact sheet for patients: https://www.fda.gov/media/316152/download    Test performed by PCR.            Adult Transthoracic Echo Complete W/ Cont if Necessary Per Protocol    Result Date: 6/15/2024    Left ventricular ejection fraction appears to be 51 - 55%.   Left ventricular wall thickness is consistent with mild concentric hypertrophy.   AV sclerosis     CT  Head Without Contrast    Result Date: 6/14/2024  CT HEAD WO CONTRAST Date of Exam: 6/14/2024 12:57 PM EDT Indication: confusion, chf, afib anticoagulated. assess for intracranial bleed. Comparison: None available. Technique: Axial CT images were obtained of the head without contrast administration.  Automated exposure control and iterative construction methods were used. FINDINGS: Gray-white differentiation is maintained and there is no evidence of intracranial hemorrhage, mass or mass effect. Age-related changes of the brain are present including volume loss and typical periventricular sequela of chronic small vessel ischemia. There is also evidence of an old right cerebellar infarct. There is otherwise no evidence of intracranial hemorrhage, mass or mass effect. The ventricles are normal in size and configuration accounting for surrounding volume loss. The orbits are normal and the paranasal sinuses are grossly clear. The calvarium is intact.     Impression: Chronic and advanced age-related changes of the brain as above, otherwise without evidence of acute intracranial abnormality. Electronically Signed: Yohannes Hua MD  6/14/2024 1:26 PM EDT  Workstation ID: [id number]    XR Chest 1 View    Result Date: 6/14/2024  XR CHEST 1 VW Date of Exam: 6/14/2024 4:08 AM EDT Indication: SOA triage protocol Comparison: None available. Findings: The heart is enlarged. There is pulmonary vascular congestion. There are small bilateral pleural effusions. There is bilateral basilar atelectasis.     Impression: Impression: Cardiomegaly with pulmonary vascular congestion and small bilateral pleural effusions. Electronically Signed: Ton Busch MD  6/14/2024 4:38 AM EDT  Workstation ID: [id number]     Results for orders placed during the hospital encounter of 06/14/24    Adult Transthoracic Echo Complete W/ Cont if Necessary Per Protocol    Interpretation Summary    Left ventricular ejection fraction appears to be 51 - 55%.    Left  ventricular wall thickness is consistent with mild concentric hypertrophy.    AV sclerosis      Current medications:  Scheduled Meds:apixaban, 5 mg, Oral, Q12H  aspirin, 81 mg, Oral, Daily  atorvastatin, 40 mg, Oral, Nightly  carvedilol, 25 mg, Oral, BID With Meals  insulin lispro, 3-14 Units, Subcutaneous, TID With Meals  iopamidol, 85 mL, Intravenous, Once in imaging  lamoTRIgine, 25 mg, Oral, Daily  pantoprazole, 40 mg, Oral, Q AM  senna-docusate sodium, 2 tablet, Oral, BID  sodium chloride, 10 mL, Intravenous, Q12H      Continuous Infusions:   PRN Meds:.  acetaminophen    senna-docusate sodium **AND** polyethylene glycol **AND** bisacodyl **AND** bisacodyl    dextrose    dextrose    glucagon (human recombinant)    ipratropium-albuterol    Magnesium Low Dose Replacement - Follow Nurse / BPA Driven Protocol    melatonin    nitroglycerin    sodium chloride    sodium chloride    Assessment & Plan   Assessment & Plan     Active Hospital Problems    Diagnosis  POA    Essential hypertension [I10]  Yes    Mixed hyperlipidemia [E78.2]  Yes    History of CVA (cerebrovascular accident) [Z86.73]  Not Applicable    Paroxysmal atrial fibrillation [I48.0]  Yes    COPD with acute exacerbation [J44.1]  Yes    Acute respiratory failure with hypoxia [J96.01]  Unknown    DOMITILA (acute kidney injury) [N17.9]  Yes    Anemia [D64.9]  Yes      Resolved Hospital Problems   No resolved problems to display.        Brief Hospital Course to date:  Lee Saintmartin is a 68 y.o. male with history of PVD s/p bilateral AKAs, CKD 3-4, atrial fibrillation on eliquis, CVA, vascular dementia, concern for Bipolar Disorder, possible COPD, chronic anemia, DMII, stroke with residual left hemiplegia, dysphagia hypercarbic/hypoxic respiratory failure presents with shortness of breath and hypoxia.  In ED CXR consistent with vascular congestion, proBNP and troponin elevated. Patient given lasix, solumedrol and started on BiPAP.    Acute Respiratory Failure  with hypoxia   Acute on Chronic HFpEF  -received IV lasix in ED on admission, further diuresis as needed  -scheduled duonebs and budesonide  -wbc count 14, likely due to steroids at admission.  Low grade temps noted (99.6).  COVID and influenza pcr negative.  It true fever, consider full respiratory panel.  - BiPAP HS and PRN  - repeat ABG in am    PAF  - eliquis    H/o CVA  - asa    Vascular Dementia    Bipolar disorder    DMII    CKD 3-4  - baseline creatinine 2.3 to 2.6    PVD  H/o bilateral AKAs    Anemia  - iron sat 39%, normal b12 and folate        Expected Discharge Location and Transportation:   Expected Discharge   Expected Discharge Date: 6/17/2024; Expected Discharge Time:      VTE Prophylaxis:  Pharmacologic & mechanical VTE prophylaxis orders are present.         AM-PAC 6 Clicks Score (PT): 6 (06/14/24 0800)    CODE STATUS:   Code Status and Medical Interventions:   Ordered at: 06/14/24 0604     Code Status (Patient has no pulse and is not breathing):    CPR (Attempt to Resuscitate)     Medical Interventions (Patient has pulse or is breathing):    Full Support       Víctor José MD  06/15/24

## 2024-06-16 LAB
ANION GAP SERPL CALCULATED.3IONS-SCNC: 10 MMOL/L (ref 5–15)
ARTERIAL PATENCY WRIST A: ABNORMAL
ATMOSPHERIC PRESS: ABNORMAL MM[HG]
BASE EXCESS BLDA CALC-SCNC: 1.4 MMOL/L (ref 0–2)
BASOPHILS # BLD AUTO: 0.06 10*3/MM3 (ref 0–0.2)
BASOPHILS NFR BLD AUTO: 0.6 % (ref 0–1.5)
BDY SITE: ABNORMAL
BODY TEMPERATURE: 37
BUN SERPL-MCNC: 32 MG/DL (ref 8–23)
BUN/CREAT SERPL: 16.2 (ref 7–25)
CALCIUM SPEC-SCNC: 8.9 MG/DL (ref 8.6–10.5)
CHLORIDE SERPL-SCNC: 102 MMOL/L (ref 98–107)
CO2 BLDA-SCNC: 28.1 MMOL/L (ref 22–33)
CO2 SERPL-SCNC: 25 MMOL/L (ref 22–29)
COHGB MFR BLD: 1.6 % (ref 0–2)
CREAT SERPL-MCNC: 1.98 MG/DL (ref 0.76–1.27)
DEPRECATED RDW RBC AUTO: 46.7 FL (ref 37–54)
EGFRCR SERPLBLD CKD-EPI 2021: 35.7 ML/MIN/1.73
EOSINOPHIL # BLD AUTO: 0.25 10*3/MM3 (ref 0–0.4)
EOSINOPHIL NFR BLD AUTO: 2.6 % (ref 0.3–6.2)
EPAP: 0
ERYTHROCYTE [DISTWIDTH] IN BLOOD BY AUTOMATED COUNT: 15.5 % (ref 12.3–15.4)
GLUCOSE BLDC GLUCOMTR-MCNC: 102 MG/DL (ref 70–130)
GLUCOSE BLDC GLUCOMTR-MCNC: 131 MG/DL (ref 70–130)
GLUCOSE BLDC GLUCOMTR-MCNC: 95 MG/DL (ref 70–130)
GLUCOSE SERPL-MCNC: 120 MG/DL (ref 65–99)
HCO3 BLDA-SCNC: 26.7 MMOL/L (ref 20–26)
HCT VFR BLD AUTO: 33.2 % (ref 37.5–51)
HCT VFR BLD CALC: 28.3 % (ref 38–51)
HGB BLD-MCNC: 10.3 G/DL (ref 13–17.7)
HGB BLDA-MCNC: 9.2 G/DL (ref 13.5–17.5)
IMM GRANULOCYTES # BLD AUTO: 0.06 10*3/MM3 (ref 0–0.05)
IMM GRANULOCYTES NFR BLD AUTO: 0.6 % (ref 0–0.5)
INHALED O2 CONCENTRATION: 28 %
IPAP: 0
LYMPHOCYTES # BLD AUTO: 0.55 10*3/MM3 (ref 0.7–3.1)
LYMPHOCYTES NFR BLD AUTO: 5.8 % (ref 19.6–45.3)
MCH RBC QN AUTO: 25.8 PG (ref 26.6–33)
MCHC RBC AUTO-ENTMCNC: 31 G/DL (ref 31.5–35.7)
MCV RBC AUTO: 83 FL (ref 79–97)
METHGB BLD QL: 0.1 % (ref 0–1.5)
MODALITY: ABNORMAL
MONOCYTES # BLD AUTO: 0.73 10*3/MM3 (ref 0.1–0.9)
MONOCYTES NFR BLD AUTO: 7.7 % (ref 5–12)
NEUTROPHILS NFR BLD AUTO: 7.88 10*3/MM3 (ref 1.7–7)
NEUTROPHILS NFR BLD AUTO: 82.7 % (ref 42.7–76)
NRBC BLD AUTO-RTO: 0 /100 WBC (ref 0–0.2)
OXYHGB MFR BLDV: 96 % (ref 94–99)
PAW @ PEAK INSP FLOW SETTING VENT: 0 CMH2O
PCO2 BLDA: 44.3 MM HG (ref 35–45)
PCO2 TEMP ADJ BLD: 44.3 MM HG (ref 35–48)
PH BLDA: 7.39 PH UNITS (ref 7.35–7.45)
PH, TEMP CORRECTED: 7.39 PH UNITS
PLATELET # BLD AUTO: 144 10*3/MM3 (ref 140–450)
PMV BLD AUTO: 12.5 FL (ref 6–12)
PO2 BLDA: 86.6 MM HG (ref 83–108)
PO2 TEMP ADJ BLD: 86.6 MM HG (ref 83–108)
POTASSIUM SERPL-SCNC: 4.8 MMOL/L (ref 3.5–5.2)
PROCALCITONIN SERPL-MCNC: 0.12 NG/ML (ref 0–0.25)
RBC # BLD AUTO: 4 10*6/MM3 (ref 4.14–5.8)
SODIUM SERPL-SCNC: 137 MMOL/L (ref 136–145)
TOTAL RATE: 0 BREATHS/MINUTE
WBC NRBC COR # BLD AUTO: 9.53 10*3/MM3 (ref 3.4–10.8)

## 2024-06-16 PROCEDURE — 82805 BLOOD GASES W/O2 SATURATION: CPT

## 2024-06-16 PROCEDURE — 94799 UNLISTED PULMONARY SVC/PX: CPT

## 2024-06-16 PROCEDURE — 80048 BASIC METABOLIC PNL TOTAL CA: CPT | Performed by: INTERNAL MEDICINE

## 2024-06-16 PROCEDURE — 94761 N-INVAS EAR/PLS OXIMETRY MLT: CPT

## 2024-06-16 PROCEDURE — 85025 COMPLETE CBC W/AUTO DIFF WBC: CPT | Performed by: INTERNAL MEDICINE

## 2024-06-16 PROCEDURE — 82948 REAGENT STRIP/BLOOD GLUCOSE: CPT

## 2024-06-16 PROCEDURE — 83050 HGB METHEMOGLOBIN QUAN: CPT

## 2024-06-16 PROCEDURE — 82375 ASSAY CARBOXYHB QUANT: CPT

## 2024-06-16 PROCEDURE — 36600 WITHDRAWAL OF ARTERIAL BLOOD: CPT

## 2024-06-16 PROCEDURE — 94664 DEMO&/EVAL PT USE INHALER: CPT

## 2024-06-16 PROCEDURE — 84145 PROCALCITONIN (PCT): CPT | Performed by: INTERNAL MEDICINE

## 2024-06-16 PROCEDURE — 99232 SBSQ HOSP IP/OBS MODERATE 35: CPT | Performed by: INTERNAL MEDICINE

## 2024-06-16 RX ADMIN — CARVEDILOL 25 MG: 12.5 TABLET, FILM COATED ORAL at 09:00

## 2024-06-16 RX ADMIN — ASPIRIN 81 MG CHEWABLE TABLET 81 MG: 81 TABLET CHEWABLE at 09:00

## 2024-06-16 RX ADMIN — APIXABAN 5 MG: 5 TABLET, FILM COATED ORAL at 20:12

## 2024-06-16 RX ADMIN — IPRATROPIUM BROMIDE AND ALBUTEROL SULFATE 3 ML: 2.5; .5 SOLUTION RESPIRATORY (INHALATION) at 16:09

## 2024-06-16 RX ADMIN — PANTOPRAZOLE SODIUM 40 MG: 40 TABLET, DELAYED RELEASE ORAL at 05:13

## 2024-06-16 RX ADMIN — Medication 5 MG: at 20:12

## 2024-06-16 RX ADMIN — IPRATROPIUM BROMIDE AND ALBUTEROL SULFATE 3 ML: 2.5; .5 SOLUTION RESPIRATORY (INHALATION) at 07:37

## 2024-06-16 RX ADMIN — IPRATROPIUM BROMIDE AND ALBUTEROL SULFATE 3 ML: 2.5; .5 SOLUTION RESPIRATORY (INHALATION) at 12:15

## 2024-06-16 RX ADMIN — CARVEDILOL 25 MG: 12.5 TABLET, FILM COATED ORAL at 20:12

## 2024-06-16 RX ADMIN — LAMOTRIGINE 25 MG: 25 TABLET ORAL at 12:34

## 2024-06-16 RX ADMIN — IPRATROPIUM BROMIDE AND ALBUTEROL SULFATE 3 ML: 2.5; .5 SOLUTION RESPIRATORY (INHALATION) at 19:08

## 2024-06-16 RX ADMIN — Medication 10 ML: at 09:08

## 2024-06-16 RX ADMIN — APIXABAN 5 MG: 5 TABLET, FILM COATED ORAL at 08:59

## 2024-06-16 RX ADMIN — BUDESONIDE 0.5 MG: 0.5 SUSPENSION RESPIRATORY (INHALATION) at 07:37

## 2024-06-16 RX ADMIN — BUDESONIDE 0.5 MG: 0.5 SUSPENSION RESPIRATORY (INHALATION) at 19:07

## 2024-06-16 RX ADMIN — SENNOSIDES AND DOCUSATE SODIUM 2 TABLET: 50; 8.6 TABLET ORAL at 20:12

## 2024-06-16 NOTE — NURSING NOTE
Pt disoriented to time and situation, intermittently confused. Pt normal sinus rhythm. O2 currently 98% on 2L nasal cannula. Pt removed O2, tele leads/stickers and peripheral I.V. during day. Pt refused glucose check at lunchtime. Safety precautions in place. Q2 turns.

## 2024-06-16 NOTE — PROGRESS NOTES
[facility] Medicine Services  PROGRESS NOTE    Patient Name: Lee Saintmartin  : 1955  MRN: [id number]    Date of Admission: 2024  Primary Care Physician: Vadim Valadez MD    Subjective   Subjective     CC: shortness of breath      HPI:  Denies cough or shortness of breath.  Mr Saintmartin also says no fevers, nausea, dysuria or loose stool.  States he was a former  in [place]. Says he currently lives at [facility].      Cousin and her spouse at bedside -- both say Mr Saintmartin is more confused than his baseline.  They relate that the patient fell from bed prior to transfer here and wonder if he could have suffered a concussion.    Patient refused labs this morning.      Objective   Objective     Vital Signs:   Temp:  [98.4 °F (36.9 °C)-99.5 °F (37.5 °C)] 98.4 °F (36.9 °C)  Heart Rate:  [75-91] 87  Resp:  [18-20] 18  BP: (154-165)/(80-84) 155/83  Flow (L/min):  [3] 3     Physical Exam:  Non toxic, in bed  MM moist  RRR  Breath sounds diminished bilaterally  Abd soft, non tender  Awake, speech clear, some confusion at times but generally more oriented today compared to yesterday.  Normal affect  Bilateral AKAs  overweight    Results Reviewed:  LAB RESULTS:      Lab 06/15/24  0524  0710 24  0409   WBC 14.73* 10.55 10.60   HEMOGLOBIN 8.9* 8.9* 8.9*   HEMATOCRIT 28.7* 29.9* 30.5*   PLATELETS 132* 140 137*   NEUTROS ABS  --  9.96* 9.43*   IMMATURE GRANS (ABS)  --  0.06*  --    LYMPHS ABS  --  0.30*  --    MONOS ABS  --  0.12  --    EOS ABS  --  0.08 0.42*   MCV 82.2 84.5 84.7         Lab 06/15/24  0524  0710 24  0409   SODIUM 141 141 139   POTASSIUM 5.4* 5.1 5.1   CHLORIDE 108* 108* 106   CO2 25.0 23.0 25.0   ANION GAP 8.0 10.0 8.0   BUN 31* 26* 24*   CREATININE 2.32* 2.29* 2.24*   EGFR 29.5* 30.0* 30.8*   GLUCOSE 129* 142* 129*   CALCIUM 8.7 8.8 8.6   MAGNESIUM 1.6  --   --    HEMOGLOBIN A1C  --   --  5.30         Lab  06/15/24  0523 06/14/24  0409   TOTAL PROTEIN 7.5 7.5   ALBUMIN 3.6 3.7   GLOBULIN 3.9 3.8   ALT (SGPT) 13 15   AST (SGOT) 14 19   BILIRUBIN 0.3 0.4   ALK PHOS 103 102         Lab 06/14/24  0921 06/14/24  0710 06/14/24  0409   PROBNP  --   --  4,208.0*   HSTROP T 117* 121* 117*             Lab 06/14/24  0710 06/14/24  0409   IRON  --  39*   IRON SATURATION (TSAT)  --  15*   TIBC  --  265*   TRANSFERRIN  --  178*   FERRITIN  --  115.10   FOLATE >20.00  --    VITAMIN B 12 727  --          Lab 06/16/24  0329 06/14/24  0433   PH, ARTERIAL 7.388 7.252*   PCO2, ARTERIAL 44.3 55.7*   PO2 ART 86.6 99.9   FIO2 28 36   HCO3 ART 26.7* 24.5   BASE EXCESS ART 1.4 -2.9*   CARBOXYHEMOGLOBIN 1.6 1.5     Brief Urine Lab Results  (Last result in the past 365 days)        Color   Clarity   Blood   Leuk Est   Nitrite   Protein   CREAT   Urine HCG        06/14/24 0918 Yellow   Clear   Negative   Trace   Negative   Negative                   Microbiology Results Abnormal       Procedure Component Value - Date/Time    COVID PRE-OP / PRE-PROCEDURE SCREENING ORDER (NO ISOLATION) - Swab, Nasopharynx [[id number]]  (Normal) Collected: 06/14/24 0416    Lab Status: Final result Specimen: Swab from Nasopharynx Updated: 06/14/24 0446    Narrative:      The following orders were created for panel order COVID PRE-OP / PRE-PROCEDURE SCREENING ORDER (NO ISOLATION) - Swab, Nasopharynx.  Procedure                               Abnormality         Status                     ---------                               -----------         ------                     COVID-19 and FLU A/B PCR...[[id number]]  Normal              Final result                 Please view results for these tests on the individual orders.    COVID-19 and FLU A/B PCR, 1 HR TAT - Swab, Nasopharynx [[id number]]  (Normal) Collected: 06/14/24 0416    Lab Status: Final result Specimen: Swab from Nasopharynx Updated: 06/14/24 0446     COVID19 Not Detected     Influenza A PCR Not Detected      Influenza B PCR Not Detected    Narrative:      Fact sheet for providers: https://www.fda.gov/media/842047/download    Fact sheet for patients: https://www.fda.gov/media/061043/download    Test performed by PCR.            Adult Transthoracic Echo Complete W/ Cont if Necessary Per Protocol    Result Date: 6/15/2024    Left ventricular ejection fraction appears to be 51 - 55%.   Left ventricular wall thickness is consistent with mild concentric hypertrophy.   AV sclerosis      Results for orders placed during the hospital encounter of 06/14/24    Adult Transthoracic Echo Complete W/ Cont if Necessary Per Protocol    Interpretation Summary    Left ventricular ejection fraction appears to be 51 - 55%.    Left ventricular wall thickness is consistent with mild concentric hypertrophy.    AV sclerosis      Current medications:  Scheduled Meds:apixaban, 5 mg, Oral, Q12H  aspirin, 81 mg, Oral, Daily  atorvastatin, 40 mg, Oral, Nightly  budesonide, 0.5 mg, Nebulization, BID - RT  carvedilol, 25 mg, Oral, BID With Meals  insulin lispro, 3-14 Units, Subcutaneous, TID With Meals  iopamidol, 85 mL, Intravenous, Once in imaging  ipratropium-albuterol, 3 mL, Nebulization, 4x Daily - RT  lamoTRIgine, 25 mg, Oral, Daily  pantoprazole, 40 mg, Oral, Q AM  senna-docusate sodium, 2 tablet, Oral, BID  sodium chloride, 10 mL, Intravenous, Q12H      Continuous Infusions:   PRN Meds:.  acetaminophen    senna-docusate sodium **AND** polyethylene glycol **AND** bisacodyl **AND** bisacodyl    dextrose    dextrose    glucagon (human recombinant)    ipratropium-albuterol    Magnesium Low Dose Replacement - Follow Nurse / BPA Driven Protocol    melatonin    nitroglycerin    sodium chloride    sodium chloride    Assessment & Plan   Assessment & Plan     Active Hospital Problems    Diagnosis  POA    Essential hypertension [I10]  Yes    Mixed hyperlipidemia [E78.2]  Yes    History of CVA (cerebrovascular accident) [Z86.73]  Not Applicable     Paroxysmal atrial fibrillation [I48.0]  Yes    COPD with acute exacerbation [J44.1]  Yes    Acute respiratory failure with hypoxia [J96.01]  Unknown    DOMITILA (acute kidney injury) [N17.9]  Yes    Anemia [D64.9]  Yes      Resolved Hospital Problems   No resolved problems to display.        Brief Hospital Course to date:  Lee Saintmartin is a 68 y.o. male with history of PVD s/p bilateral AKAs, CKD 3-4, atrial fibrillation on eliquis, CVA, vascular dementia, concern for Bipolar Disorder, possible COPD, chronic anemia, DMII, stroke with residual left hemiplegia, dysphagia hypercarbic/hypoxic respiratory failure presents with shortness of breath and hypoxia.  In ED CXR consistent with vascular congestion, proBNP and troponin elevated. Patient given lasix, solumedrol and started on BiPAP.    Acute Respiratory Failure with hypoxia   Acute on Chronic HFpEF  -received IV lasix in ED on admission, further diuresis as needed  -scheduled duonebs and budesonide  -wbc count 14, likely due to steroids at admission.  Continued low grade temps noted.  - COVID and influenza pcr negative.    - BiPAP HS and PRN  - acidosis resolved by ABG this am    PAF  - eliquis    H/o CVA  - asa    Vascular Dementia?  - some continued confusion persists today -- cousin at bedside says patient is not his normal self.  Cousin describes fall from bed approx one week ago with head strike.  CT head at [facility] ED unremarkable, and CT head imaging performed here at admission without acute findings.  I did speak with the patient's niece today, and she says she noted some slurred speech on Monday (prior to admission).  Will send patient for an MRI brain today.  If symptoms persist will ask Neurology to evaluate tomorrow.    Bipolar disorder    DMII    CKD 3-4  - baseline creatinine 2.3 to 2.6    Hyperkalemia  - K 5.4 yesterday, patient refused labs this morning, asked staff to see if patient will let us try to draw labs this afternoon    PVD  H/o bilateral  Billy    Anemia  - iron sat 39%, normal b12 and folate    Called and updated Niece/ERWIN Gimenez    Expected Discharge Location and Transportation:   Expected Discharge   Expected Discharge Date: 6/17/2024; Expected Discharge Time:      VTE Prophylaxis:  Pharmacologic & mechanical VTE prophylaxis orders are present.         AM-PAC 6 Clicks Score (PT): 6 (06/14/24 0800)    CODE STATUS:   Code Status and Medical Interventions:   Ordered at: 06/14/24 0604     Code Status (Patient has no pulse and is not breathing):    CPR (Attempt to Resuscitate)     Medical Interventions (Patient has pulse or is breathing):    Full Support       Víctor José MD  06/16/24

## 2024-06-17 ENCOUNTER — APPOINTMENT (OUTPATIENT)
Dept: MRI IMAGING | Facility: HOSPITAL | Age: 69
End: 2024-06-17
Payer: MEDICARE

## 2024-06-17 ENCOUNTER — APPOINTMENT (OUTPATIENT)
Dept: GENERAL RADIOLOGY | Facility: HOSPITAL | Age: 69
End: 2024-06-17
Payer: MEDICARE

## 2024-06-17 LAB
ALBUMIN SERPL-MCNC: 3.7 G/DL (ref 3.5–5.2)
ALBUMIN/GLOB SERPL: 1 G/DL
ALP SERPL-CCNC: 97 U/L (ref 39–117)
ALT SERPL W P-5'-P-CCNC: 15 U/L (ref 1–41)
ANION GAP SERPL CALCULATED.3IONS-SCNC: 11 MMOL/L (ref 5–15)
AST SERPL-CCNC: 35 U/L (ref 1–40)
BASOPHILS # BLD AUTO: 0.05 10*3/MM3 (ref 0–0.2)
BASOPHILS NFR BLD AUTO: 0.5 % (ref 0–1.5)
BILIRUB SERPL-MCNC: 0.5 MG/DL (ref 0–1.2)
BUN SERPL-MCNC: 28 MG/DL (ref 8–23)
BUN/CREAT SERPL: 14.9 (ref 7–25)
CALCIUM SPEC-SCNC: 9 MG/DL (ref 8.6–10.5)
CHLORIDE SERPL-SCNC: 99 MMOL/L (ref 98–107)
CO2 SERPL-SCNC: 25 MMOL/L (ref 22–29)
CREAT SERPL-MCNC: 1.88 MG/DL (ref 0.76–1.27)
DEPRECATED RDW RBC AUTO: 45.2 FL (ref 37–54)
EGFRCR SERPLBLD CKD-EPI 2021: 38 ML/MIN/1.73
EOSINOPHIL # BLD AUTO: 0.34 10*3/MM3 (ref 0–0.4)
EOSINOPHIL NFR BLD AUTO: 3.4 % (ref 0.3–6.2)
ERYTHROCYTE [DISTWIDTH] IN BLOOD BY AUTOMATED COUNT: 15.2 % (ref 12.3–15.4)
GLOBULIN UR ELPH-MCNC: 3.6 GM/DL
GLUCOSE BLDC GLUCOMTR-MCNC: 106 MG/DL (ref 70–130)
GLUCOSE BLDC GLUCOMTR-MCNC: 141 MG/DL (ref 70–130)
GLUCOSE BLDC GLUCOMTR-MCNC: 206 MG/DL (ref 70–130)
GLUCOSE BLDC GLUCOMTR-MCNC: 87 MG/DL (ref 70–130)
GLUCOSE SERPL-MCNC: 98 MG/DL (ref 65–99)
HCT VFR BLD AUTO: 33.1 % (ref 37.5–51)
HGB BLD-MCNC: 10.5 G/DL (ref 13–17.7)
IMM GRANULOCYTES # BLD AUTO: 0.02 10*3/MM3 (ref 0–0.05)
IMM GRANULOCYTES NFR BLD AUTO: 0.2 % (ref 0–0.5)
LYMPHOCYTES # BLD AUTO: 0.77 10*3/MM3 (ref 0.7–3.1)
LYMPHOCYTES NFR BLD AUTO: 7.6 % (ref 19.6–45.3)
MCH RBC QN AUTO: 25.9 PG (ref 26.6–33)
MCHC RBC AUTO-ENTMCNC: 31.7 G/DL (ref 31.5–35.7)
MCV RBC AUTO: 81.5 FL (ref 79–97)
MONOCYTES # BLD AUTO: 0.82 10*3/MM3 (ref 0.1–0.9)
MONOCYTES NFR BLD AUTO: 8.1 % (ref 5–12)
NEUTROPHILS NFR BLD AUTO: 8.1 10*3/MM3 (ref 1.7–7)
NEUTROPHILS NFR BLD AUTO: 80.2 % (ref 42.7–76)
NRBC BLD AUTO-RTO: 0 /100 WBC (ref 0–0.2)
PLAT MORPH BLD: NORMAL
PLATELET # BLD AUTO: 150 10*3/MM3 (ref 140–450)
POTASSIUM SERPL-SCNC: 4.5 MMOL/L (ref 3.5–5.2)
PROT SERPL-MCNC: 7.3 G/DL (ref 6–8.5)
RBC # BLD AUTO: 4.06 10*6/MM3 (ref 4.14–5.8)
RBC MORPH BLD: NORMAL
SODIUM SERPL-SCNC: 135 MMOL/L (ref 136–145)
TSH SERPL DL<=0.05 MIU/L-ACNC: 2.64 UIU/ML (ref 0.27–4.2)
WBC MORPH BLD: NORMAL
WBC NRBC COR # BLD AUTO: 10.1 10*3/MM3 (ref 3.4–10.8)

## 2024-06-17 PROCEDURE — 82948 REAGENT STRIP/BLOOD GLUCOSE: CPT

## 2024-06-17 PROCEDURE — 85007 BL SMEAR W/DIFF WBC COUNT: CPT | Performed by: INTERNAL MEDICINE

## 2024-06-17 PROCEDURE — 84443 ASSAY THYROID STIM HORMONE: CPT | Performed by: INTERNAL MEDICINE

## 2024-06-17 PROCEDURE — 70551 MRI BRAIN STEM W/O DYE: CPT

## 2024-06-17 PROCEDURE — 25010000002 ONDANSETRON PER 1 MG: Performed by: PHYSICIAN ASSISTANT

## 2024-06-17 PROCEDURE — 94664 DEMO&/EVAL PT USE INHALER: CPT

## 2024-06-17 PROCEDURE — 99232 SBSQ HOSP IP/OBS MODERATE 35: CPT | Performed by: INTERNAL MEDICINE

## 2024-06-17 PROCEDURE — 25010000002 THIAMINE HCL 200 MG/2ML SOLUTION: Performed by: INTERNAL MEDICINE

## 2024-06-17 PROCEDURE — 99222 1ST HOSP IP/OBS MODERATE 55: CPT

## 2024-06-17 PROCEDURE — 94799 UNLISTED PULMONARY SVC/PX: CPT

## 2024-06-17 PROCEDURE — 85025 COMPLETE CBC W/AUTO DIFF WBC: CPT | Performed by: INTERNAL MEDICINE

## 2024-06-17 PROCEDURE — 94761 N-INVAS EAR/PLS OXIMETRY MLT: CPT

## 2024-06-17 PROCEDURE — 80053 COMPREHEN METABOLIC PANEL: CPT | Performed by: INTERNAL MEDICINE

## 2024-06-17 PROCEDURE — 25010000002 DIPHENHYDRAMINE PER 50 MG: Performed by: PHYSICIAN ASSISTANT

## 2024-06-17 PROCEDURE — 97161 PT EVAL LOW COMPLEX 20 MIN: CPT

## 2024-06-17 PROCEDURE — 74018 RADEX ABDOMEN 1 VIEW: CPT

## 2024-06-17 RX ORDER — ZIPRASIDONE MESYLATE 20 MG/ML
10 INJECTION, POWDER, LYOPHILIZED, FOR SOLUTION INTRAMUSCULAR EVERY 4 HOURS PRN
Status: DISCONTINUED | OUTPATIENT
Start: 2024-06-17 | End: 2024-06-19 | Stop reason: HOSPADM

## 2024-06-17 RX ORDER — DIPHENHYDRAMINE HYDROCHLORIDE 50 MG/ML
12.5 INJECTION INTRAMUSCULAR; INTRAVENOUS ONCE
Status: COMPLETED | OUTPATIENT
Start: 2024-06-17 | End: 2024-06-17

## 2024-06-17 RX ORDER — THIAMINE HYDROCHLORIDE 100 MG/ML
200 INJECTION, SOLUTION INTRAMUSCULAR; INTRAVENOUS DAILY
Status: DISCONTINUED | OUTPATIENT
Start: 2024-06-17 | End: 2024-06-17

## 2024-06-17 RX ORDER — LAMOTRIGINE 25 MG/1
25 TABLET ORAL EVERY 8 HOURS SCHEDULED
Status: DISCONTINUED | OUTPATIENT
Start: 2024-06-17 | End: 2024-06-19 | Stop reason: HOSPADM

## 2024-06-17 RX ORDER — ONDANSETRON 2 MG/ML
4 INJECTION INTRAMUSCULAR; INTRAVENOUS EVERY 6 HOURS PRN
Status: DISCONTINUED | OUTPATIENT
Start: 2024-06-17 | End: 2024-06-19 | Stop reason: HOSPADM

## 2024-06-17 RX ORDER — UREA 10 %
5 LOTION (ML) TOPICAL NIGHTLY
Status: DISCONTINUED | OUTPATIENT
Start: 2024-06-17 | End: 2024-06-19 | Stop reason: HOSPADM

## 2024-06-17 RX ORDER — DONEPEZIL HYDROCHLORIDE 10 MG/1
5 TABLET, FILM COATED ORAL NIGHTLY
Status: DISCONTINUED | OUTPATIENT
Start: 2024-06-17 | End: 2024-06-19 | Stop reason: HOSPADM

## 2024-06-17 RX ORDER — GABAPENTIN 100 MG/1
100 CAPSULE ORAL 3 TIMES DAILY
Status: DISCONTINUED | OUTPATIENT
Start: 2024-06-17 | End: 2024-06-19 | Stop reason: HOSPADM

## 2024-06-17 RX ORDER — THIAMINE HYDROCHLORIDE 100 MG/ML
200 INJECTION, SOLUTION INTRAMUSCULAR; INTRAVENOUS DAILY
Status: DISCONTINUED | OUTPATIENT
Start: 2024-06-18 | End: 2024-06-19 | Stop reason: HOSPADM

## 2024-06-17 RX ADMIN — CARVEDILOL 25 MG: 12.5 TABLET, FILM COATED ORAL at 09:25

## 2024-06-17 RX ADMIN — BUDESONIDE 0.5 MG: 0.5 SUSPENSION RESPIRATORY (INHALATION) at 07:53

## 2024-06-17 RX ADMIN — CARVEDILOL 25 MG: 12.5 TABLET, FILM COATED ORAL at 18:28

## 2024-06-17 RX ADMIN — GABAPENTIN 100 MG: 100 CAPSULE ORAL at 20:39

## 2024-06-17 RX ADMIN — Medication 10 ML: at 20:42

## 2024-06-17 RX ADMIN — IPRATROPIUM BROMIDE AND ALBUTEROL SULFATE 3 ML: 2.5; .5 SOLUTION RESPIRATORY (INHALATION) at 13:13

## 2024-06-17 RX ADMIN — IPRATROPIUM BROMIDE AND ALBUTEROL SULFATE 3 ML: 2.5; .5 SOLUTION RESPIRATORY (INHALATION) at 15:31

## 2024-06-17 RX ADMIN — ASPIRIN 81 MG CHEWABLE TABLET 81 MG: 81 TABLET CHEWABLE at 09:20

## 2024-06-17 RX ADMIN — Medication 10 ML: at 12:10

## 2024-06-17 RX ADMIN — LAMOTRIGINE 25 MG: 25 TABLET ORAL at 20:38

## 2024-06-17 RX ADMIN — IPRATROPIUM BROMIDE AND ALBUTEROL SULFATE 3 ML: 2.5; .5 SOLUTION RESPIRATORY (INHALATION) at 19:36

## 2024-06-17 RX ADMIN — THIAMINE HYDROCHLORIDE 200 MG: 100 INJECTION, SOLUTION INTRAMUSCULAR; INTRAVENOUS at 09:58

## 2024-06-17 RX ADMIN — IPRATROPIUM BROMIDE AND ALBUTEROL SULFATE 3 ML: 2.5; .5 SOLUTION RESPIRATORY (INHALATION) at 07:53

## 2024-06-17 RX ADMIN — APIXABAN 5 MG: 5 TABLET, FILM COATED ORAL at 09:20

## 2024-06-17 RX ADMIN — APIXABAN 5 MG: 5 TABLET, FILM COATED ORAL at 20:39

## 2024-06-17 RX ADMIN — GABAPENTIN 100 MG: 100 CAPSULE ORAL at 17:09

## 2024-06-17 RX ADMIN — LAMOTRIGINE 25 MG: 25 TABLET ORAL at 09:20

## 2024-06-17 RX ADMIN — Medication 5 MG: at 20:39

## 2024-06-17 RX ADMIN — DONEPEZIL HYDROCHLORIDE 5 MG: 10 TABLET, FILM COATED ORAL at 20:39

## 2024-06-17 RX ADMIN — ONDANSETRON 4 MG: 2 INJECTION INTRAMUSCULAR; INTRAVENOUS at 22:24

## 2024-06-17 RX ADMIN — SENNOSIDES AND DOCUSATE SODIUM 2 TABLET: 50; 8.6 TABLET ORAL at 09:20

## 2024-06-17 RX ADMIN — BUDESONIDE 0.5 MG: 0.5 SUSPENSION RESPIRATORY (INHALATION) at 19:36

## 2024-06-17 RX ADMIN — DIPHENHYDRAMINE HYDROCHLORIDE 12.5 MG: 50 INJECTION INTRAMUSCULAR; INTRAVENOUS at 22:24

## 2024-06-17 RX ADMIN — ATORVASTATIN CALCIUM 40 MG: 40 TABLET, FILM COATED ORAL at 20:39

## 2024-06-17 NOTE — THERAPY EVALUATION
Patient Name: Lee Saintmartin  : 1955    MRN: [id number]                              Today's Date: 2024       Admit Date: 2024    Visit Dx:     ICD-10-CM ICD-9-CM   1. Acute on chronic systolic congestive heart failure  I50.23 428.23     428.0   2. COPD exacerbation  J44.1 491.21   3. Acute respiratory failure with hypoxia and hypercapnia  J96.01 518.81    J96.02      Patient Active Problem List   Diagnosis    Essential hypertension    Mixed hyperlipidemia    History of CVA (cerebrovascular accident)    Paroxysmal atrial fibrillation    COPD with acute exacerbation    Acute respiratory failure with hypoxia    DOMITILA (acute kidney injury)    Anemia     Past Medical History:   Diagnosis Date    Anemia     Atrial fibrillation     Dementia     Diabetes mellitus     GERD (gastroesophageal reflux disease)     Hyperlipidemia     Hypertension     Major depressive disorder     Pulmonary fibrosis     PVD (peripheral vascular disease)     Renal disorder     Stroke      Past Surgical History:   Procedure Laterality Date    ABOVE KNEE AMPUTATION Bilateral       General Information       Row Name 24 1516          Physical Therapy Time and Intention    Document Type evaluation  -     Mode of Treatment physical therapy  -       Row Name 24 1516          General Information    Patient Profile Reviewed yes  -     Prior Level of Function dependent:;ADL's   Pt is a questionable historian. Per chart review, pt is dependent for ADLs and uses a WC at baseline. Pt reports he transfers bed>WC w/o use of lift  -     Existing Precautions/Restrictions fall;oxygen therapy device and L/min;other (see comments)  Remote B AKA, vascular dementia  -     Barriers to Rehab medically complex;previous functional deficit;cognitive status;physical barrier  -       Row Name 24 1516          Living Environment    People in Home facility resident  [facility]  -       Row Name 24  1516          Home Main Entrance    Number of Stairs, Main Entrance none  -       Row Name 06/17/24 1516          Stairs Within Home, Primary    Number of Stairs, Within Home, Primary none  -       Row Name 06/17/24 1516          Cognition    Orientation Status (Cognition) oriented to;person;place;disoriented to;situation;time  -       Row Name 06/17/24 1516          Safety Issues, Functional Mobility    Safety Issues Affecting Function (Mobility) awareness of need for assistance;insight into deficits/self-awareness;judgment;safety precaution awareness;safety precautions follow-through/compliance;sequencing abilities  -     Impairments Affecting Function (Mobility) balance;cognition;endurance/activity tolerance;pain;shortness of breath;strength  -     Cognitive Impairments, Mobility Safety/Performance awareness, need for assistance;insight into deficits/self-awareness;judgment;problem-solving/reasoning;safety precaution awareness;safety precaution follow-through;sequencing abilities  -               User Key  (r) = Recorded By, (t) = Taken By, (c) = Cosigned By      Initials Name Provider Type     Sasha Fisher PT Physical Therapist                   Mobility       Row Name 06/17/24 1520          Bed Mobility    Bed Mobility rolling left;rolling right  -LH     Rolling Left [place] (Bed Mobility) maximum assist (25% patient effort);2 person assist;verbal cues;nonverbal cues (demo/gesture);1 person to manage equipment  -LH     Rolling Right [place] (Bed Mobility) maximum assist (25% patient effort);2 person assist;verbal cues;nonverbal cues (demo/gesture);1 person to manage equipment  -     Assistive Device (Bed Mobility) bed rails;draw sheet  -     Comment, (Bed Mobility) Rolling bilaterally performed x2 each for dep pericare. VCs/TCs for hand placement and sequencing. Cues to hold onto bedrail. Pt posteriorly pushing throughout rolling and started to become agitated. RN and PCT assumed  care  -[facility] Name 06/17/24 1520          Transfers    Comment, (Transfers) Deferred further mobility d/t pt starting to become agitated during rolling w/ RN and PCT assuming care  -[facility] Name 06/17/24 1520          Bed-Chair Transfer    Bed-Chair [place] (Transfers) unable to assess  -LH       Row Name 06/17/24 1520          Sit-Stand Transfer    Sit-Stand [place] (Transfers) unable to assess  -LH       Row Name 06/17/24 1520          Gait/Stairs (Locomotion)    [place] Level (Gait) unable to assess  -     Comment, (Gait/Stairs) Pt non-ambulatory at baseline  -               User Key  (r) = Recorded By, (t) = Taken By, (c) = Cosigned By      Initials Name Provider Type     Sasha Fisher PT Physical Therapist                   Obj/Interventions       [facility] Name 06/17/24 1523          Range of Motion Comprehensive    General Range of Motion lower extremity range of motion deficits identified  -     Comment, General Range of Motion B hip flexion decreased d/t body habitus  -LH       Row Name 06/17/24 1523          Strength Comprehensive (MMT)    General Manual Muscle Testing (MMT) Assessment lower extremity strength deficits identified  -     Comment, General Manual Muscle Testing (MMT) Assessment Observed, BLEs grossly 4/5  -[facility] Name 06/17/24 1523          Balance    Comment, Balance Unable to assess sitting balance this date  -LH       Row Name 06/17/24 1523          Sensory Assessment (Somatosensory)    Sensory Assessment (Somatosensory) LE sensation intact  -               User Key  (r) = Recorded By, (t) = Taken By, (c) = Cosigned By      Initials Name Provider Type     Sasha Fisher PT Physical Therapist                   Goals/Plan       [facility] Name 06/17/24 1530          Bed Mobility Goal 1 (PT)    Activity/Assistive Device (Bed Mobility Goal 1, PT) bed mobility activities, all  -     [place] Level/Cues Needed (Bed Mobility Goal 1, PT) moderate assist  (50-74% patient effort)  -     Time Frame (Bed Mobility Goal 1, PT) short term goal (STG);4 days  -[facility] Name 06/17/24 1530          Transfer Goal 1 (PT)    Activity/Assistive Device (Transfer Goal 1, PT) wheelchair transfer;bed-to-chair/chair-to-bed  -     [place] Level/Cues Needed (Transfer Goal 1, PT) maximum assist (25-49% patient effort)  -     Time Frame (Transfer Goal 1, PT) long term goal (LTG);10 days  -[facility] Name 06/17/24 1530          Therapy Assessment/Plan (PT)    Planned Therapy Interventions (PT) balance training;bed mobility training;home exercise program;neuromuscular re-education;patient/family education;gait training;postural re-education;ROM (range of motion);strengthening;stretching;transfer training;wheelchair management/propulsion training  -               User Key  (r) = Recorded By, (t) = Taken By, (c) = Cosigned By      Initials Name Provider Type     Sasha Fisher, PT Physical Therapist                   Clinical Impression       Row Name 06/17/24 1526          Pain    Pretreatment Pain Rating 0/10 - no pain  -     Posttreatment Pain Rating 0/10 - no pain  -     Pre/Posttreatment Pain Comment Pt yelling out in pain during bed mobility  -     Pain Intervention(s) Repositioned  -LH       Row Name 06/17/24 3216          Plan of Care Review    Plan of Care Reviewed With patient  -     Outcome Evaluation PT eval completed. Pt presents below baseline function d/t generalized weakness and decreased activity tolerance. Pt performed rolling in bed w/ maxA x2. Pt would benefit from skilled IP PT. Recommend return to nursing facility at d/c.  -[facility] Name 06/17/24 1524          Therapy Assessment/Plan (PT)    Patient/Family Therapy Goals Statement (PT) to return to [facility]  -     Rehab Potential (PT) fair, will monitor progress closely  -     Criteria for Skilled Interventions Met (PT) yes;meets criteria;skilled treatment is necessary  [facility]      Therapy Frequency (PT) daily  -     Predicted Duration of Therapy Intervention (PT) 5 days  -[facility] Name 06/17/24 1526          Vital Signs    Pre Systolic BP Rehab 155  -LH     Pre Treatment Diastolic BP 76  -LH     Pretreatment Heart Rate (beats/min) 84  -LH     Pre SpO2 (%) 96  -LH     O2 Delivery Pre Treatment nasal cannula  -LH     O2 Delivery Intra Treatment nasal cannula  -LH     O2 Delivery Post Treatment nasal cannula  -LH     Pre Patient Position Supine  -LH     Intra Patient Position Side Lying  -LH     Post Patient Position Supine  -[facility] Name 06/17/24 1526          Positioning and Restraints    Pre-Treatment Position in bed  -LH     Post Treatment Position bed  -LH     In Bed notified nsg;supine;call light within reach;encouraged to call for assist;with nsg;side rails up x3  -               User Key  (r) = Recorded By, (t) = Taken By, (c) = Cosigned By      Initials Name Provider Type    Sasha Olivas, CLARA Physical Therapist                   Outcome Measures       Row Name 06/17/24 1531          How much help from another person do you currently need...    Turning from your back to your side while in flat bed without using bedrails? 2  -LH     Moving from lying on back to sitting on the side of a flat bed without bedrails? 2  -LH     Moving to and from a bed to a chair (including a wheelchair)? 1  -LH     Standing up from a chair using your arms (e.g., wheelchair, bedside chair)? 1  -LH     Climbing 3-5 steps with a railing? 1  -LH     To walk in hospital room? 1  -     AM-PAC 6 Clicks Score (PT) 8  -     Highest Level of Mobility Goal 3 --> Sit at edge of bed  -[facility] Name 06/17/24 1531          Functional Assessment    Outcome Measure Options AM-PAC 6 Clicks Basic Mobility (PT)  -               User Key  (r) = Recorded By, (t) = Taken By, (c) = Cosigned By      Initials Name Provider Type    Sasha Olivas PT Physical Therapist                                  Physical Therapy Education       Title: PT OT SLP Therapies (In Progress)       Topic: Physical Therapy (In Progress)       Point: Mobility training (In Progress)       Learning Progress Summary             Patient Acceptance, E, NR by  at 6/17/2024 1531                         Point: Home exercise program (Not Started)       Learner Progress:  Not documented in this visit.              Point: Body mechanics (In Progress)       Learning Progress Summary             Patient Acceptance, E, NR by  at 6/17/2024 1531                         Point: Precautions (In Progress)       Learning Progress Summary             Patient Acceptance, E, NR by  at 6/17/2024 1531                                         User Key       Initials Effective Dates Name Provider Type Discipline     09/21/23 -  Sasha Fisher, PT Physical Therapist PT                  PT Recommendation and Plan  Planned Therapy Interventions (PT): balance training, bed mobility training, home exercise program, neuromuscular re-education, patient/family education, gait training, postural re-education, ROM (range of motion), strengthening, stretching, transfer training, wheelchair management/propulsion training  Plan of Care Reviewed With: patient  Outcome Evaluation: PT eval completed. Pt presents below baseline function d/t generalized weakness and decreased activity tolerance. Pt performed rolling in bed w/ maxA x2. Pt would benefit from skilled IP PT. Recommend return to nursing facility at d/c.     Time Calculation:   PT Evaluation Complexity  History, PT Evaluation Complexity: 3 or more personal factors and/or comorbidities  Examination of Body Systems (PT Eval Complexity): total of 4 or more elements  Clinical Presentation (PT Evaluation Complexity): evolving  Clinical Decision Making (PT Evaluation Complexity): low complexity  Overall Complexity (PT Evaluation Complexity): low complexity     PT Charges       Row Name 06/17/24 1536              Time Calculation    Start Time 1435  -      PT Received On 06/17/24  -      PT Goal Re-Cert Due Date 06/27/24  -         Untimed Charges    PT Eval/Re-eval Minutes 46  -LH         Total Minutes    Untimed Charges Total Minutes 46  -LH       Total Minutes 46  -LH                User Key  (r) = Recorded By, (t) = Taken By, (c) = Cosigned By      Initials Name Provider Type     Sasha Fisher, PT Physical Therapist                  Therapy Charges for Today       Code Description Service Date Service Provider Modifiers Qty    08291907287 HC PT EVAL LOW COMPLEXITY 4 6/17/2024 Sasha Fisher, PT GP 1    45499118865 HC PT THER SUPP EA 15 MIN 6/17/2024 Sasha Fisher, PT GP 2            PT G-Codes  Outcome Measure Options: AM-PAC 6 Clicks Basic Mobility (PT)  AM-PAC 6 Clicks Score (PT): 8  PT Discharge Summary  Anticipated Discharge Disposition (PT): extended care facility, other (see comments) (return to [facility])    Sasha Fisher PT  6/17/2024

## 2024-06-17 NOTE — PROGRESS NOTES
"    [facility] Medicine Services  PROGRESS NOTE    Patient Name: Lee Saintmartin  : 1955  MRN: [id number]    Date of Admission: 2024  Primary Care Physician: Vdaim Valadez MD    Subjective   Subjective     CC: shortness of breath      HPI:  No complaints this morning  Unsure of place, year \",\" does not know why he came to the hospital (\"I was in the car with two people and the brought me here\").  Denies cough  No nausea or abdominal pain  No loose stool or dysuria      Objective   Objective     Vital Signs:   Temp:  [98 °F (36.7 °C)-99.2 °F (37.3 °C)] 98 °F (36.7 °C)  Heart Rate:  [76-87] 81  Resp:  [16-20] 16  BP: (152-166)/(75-91) 166/80  Flow (L/min):  [2-3] 2     Physical Exam:  Non toxic, in bed  MM moist  RRR  Breath sounds grossly clear bilaterally  Abd soft, NT  Obese  Bilateral AKAs  Awake, speech clear but confused, possibly confabulating    Results Reviewed:  LAB RESULTS:      Lab 24  1413 24  1412 06/15/24  0523 24  0710 24  0409   WBC 9.53  --  14.73* 10.55 10.60   HEMOGLOBIN 10.3*  --  8.9* 8.9* 8.9*   HEMATOCRIT 33.2*  --  28.7* 29.9* 30.5*   PLATELETS 144  --  132* 140 137*   NEUTROS ABS 7.88*  --   --  9.96* 9.43*   IMMATURE GRANS (ABS) 0.06*  --   --  0.06*  --    LYMPHS ABS 0.55*  --   --  0.30*  --    MONOS ABS 0.73  --   --  0.12  --    EOS ABS 0.25  --   --  0.08 0.42*   MCV 83.0  --  82.2 84.5 84.7   PROCALCITONIN  --  0.12  --   --   --          Lab 24  1412 06/15/24  0523 24  0710 24  0409   SODIUM 137 141 141 139   POTASSIUM 4.8 5.4* 5.1 5.1   CHLORIDE 102 108* 108* 106   CO2 25.0 25.0 23.0 25.0   ANION GAP 10.0 8.0 10.0 8.0   BUN 32* 31* 26* 24*   CREATININE 1.98* 2.32* 2.29* 2.24*   EGFR 35.7* 29.5* 30.0* 30.8*   GLUCOSE 120* 129* 142* 129*   CALCIUM 8.9 8.7 8.8 8.6   MAGNESIUM  --  1.6  --   --    HEMOGLOBIN A1C  --   --   --  5.30         Lab 06/15/24  0523 24  0409   TOTAL PROTEIN 7.5 7.5 "   ALBUMIN 3.6 3.7   GLOBULIN 3.9 3.8   ALT (SGPT) 13 15   AST (SGOT) 14 19   BILIRUBIN 0.3 0.4   ALK PHOS 103 102         Lab 06/14/24  0921 06/14/24  0710 06/14/24  0409   PROBNP  --   --  4,208.0*   HSTROP T 117* 121* 117*             Lab 06/14/24  0710 06/14/24  0409   IRON  --  39*   IRON SATURATION (TSAT)  --  15*   TIBC  --  265*   TRANSFERRIN  --  178*   FERRITIN  --  115.10   FOLATE >20.00  --    VITAMIN B 12 727  --          Lab 06/16/24  0329 06/14/24  0433   PH, ARTERIAL 7.388 7.252*   PCO2, ARTERIAL 44.3 55.7*   PO2 ART 86.6 99.9   FIO2 28 36   HCO3 ART 26.7* 24.5   BASE EXCESS ART 1.4 -2.9*   CARBOXYHEMOGLOBIN 1.6 1.5     Brief Urine Lab Results  (Last result in the past 365 days)        Color   Clarity   Blood   Leuk Est   Nitrite   Protein   CREAT   Urine HCG        06/14/24 0918 Yellow   Clear   Negative   Trace   Negative   Negative                   Microbiology Results Abnormal       Procedure Component Value - Date/Time    COVID PRE-OP / PRE-PROCEDURE SCREENING ORDER (NO ISOLATION) - Swab, Nasopharynx [[id number]]  (Normal) Collected: 06/14/24 0416    Lab Status: Final result Specimen: Swab from Nasopharynx Updated: 06/14/24 0446    Narrative:      The following orders were created for panel order COVID PRE-OP / PRE-PROCEDURE SCREENING ORDER (NO ISOLATION) - Swab, Nasopharynx.  Procedure                               Abnormality         Status                     ---------                               -----------         ------                     COVID-19 and FLU A/B PCR...[[id number]]  Normal              Final result                 Please view results for these tests on the individual orders.    COVID-19 and FLU A/B PCR, 1 HR TAT - Swab, Nasopharynx [[id number]]  (Normal) Collected: 06/14/24 0416    Lab Status: Final result Specimen: Swab from Nasopharynx Updated: 06/14/24 0446     COVID19 Not Detected     Influenza A PCR Not Detected     Influenza B PCR Not Detected    Narrative:      Fact  sheet for providers: https://www.fda.gov/media/375429/download    Fact sheet for patients: https://www.fda.gov/media/271320/download    Test performed by PCR.            Adult Transthoracic Echo Complete W/ Cont if Necessary Per Protocol    Result Date: 6/15/2024    Left ventricular ejection fraction appears to be 51 - 55%.   Left ventricular wall thickness is consistent with mild concentric hypertrophy.   AV sclerosis      Results for orders placed during the hospital encounter of 06/14/24    Adult Transthoracic Echo Complete W/ Cont if Necessary Per Protocol    Interpretation Summary    Left ventricular ejection fraction appears to be 51 - 55%.    Left ventricular wall thickness is consistent with mild concentric hypertrophy.    AV sclerosis      Current medications:  Scheduled Meds:apixaban, 5 mg, Oral, Q12H  aspirin, 81 mg, Oral, Daily  atorvastatin, 40 mg, Oral, Nightly  budesonide, 0.5 mg, Nebulization, BID - RT  carvedilol, 25 mg, Oral, BID With Meals  insulin lispro, 3-14 Units, Subcutaneous, TID With Meals  iopamidol, 85 mL, Intravenous, Once in imaging  ipratropium-albuterol, 3 mL, Nebulization, 4x Daily - RT  lamoTRIgine, 25 mg, Oral, Daily  pantoprazole, 40 mg, Oral, Q AM  senna-docusate sodium, 2 tablet, Oral, BID  sodium chloride, 10 mL, Intravenous, Q12H      Continuous Infusions:   PRN Meds:.  acetaminophen    senna-docusate sodium **AND** polyethylene glycol **AND** bisacodyl **AND** bisacodyl    dextrose    dextrose    glucagon (human recombinant)    ipratropium-albuterol    Magnesium Low Dose Replacement - Follow Nurse / BPA Driven Protocol    melatonin    nitroglycerin    sodium chloride    sodium chloride    Assessment & Plan   Assessment & Plan     Active Hospital Problems    Diagnosis  POA    Essential hypertension [I10]  Yes    Mixed hyperlipidemia [E78.2]  Yes    History of CVA (cerebrovascular accident) [Z86.73]  Not Applicable    Paroxysmal atrial fibrillation [I48.0]  Yes    COPD with  acute exacerbation [J44.1]  Yes    Acute respiratory failure with hypoxia [J96.01]  Unknown    DOMITILA (acute kidney injury) [N17.9]  Yes    Anemia [D64.9]  Yes      Resolved Hospital Problems   No resolved problems to display.        Brief Hospital Course to date:  Lee Saintmartin is a 68 y.o. male with history of PVD s/p bilateral AKAs, CKD 3-4, atrial fibrillation on eliquis, CVA, vascular dementia, concern for Bipolar Disorder, possible COPD, chronic anemia, DMII, stroke with residual left hemiplegia, dysphagia hypercarbic/hypoxic respiratory failure presents with shortness of breath and hypoxia.  In ED CXR consistent with vascular congestion, proBNP and troponin elevated. Patient given lasix, solumedrol and started on BiPAP.    Acute Respiratory Failure with hypoxia   Acute on Chronic HFpEF  -received IV lasix in ED on admission, further diuresis as needed  -scheduled duonebs and budesonide  -wbc count 14, likely due to steroids at admission.  Continued low grade temps noted.  - COVID and influenza pcr negative.    - BiPAP HS and PRN  - acidosis resolved by ABG this am    PAF  - eliquis    H/o CVA  - asa    Delirium  Dementia?  - patient confusion continues today  - cousin at bedside yesterday stated patient is not his normal self.  Cousin describes fall from bed approx one week ago with head strike.  CT head at [facility] ED unremarkable, and CT head imaging performed here at admission without acute findings.  I did speak with the patient's niece, and she says she noted some slurred speech on Monday (prior to admission).    - MRI brain pending  - TSH pending  - consider checking lamictal level  - IV thiamine  - Neurology consultation    Bipolar disorder  - on lamictal     DMII    CKD 3-4  - baseline creatinine 2.3 to 2.6  - creatinine 1.8    Hyperkalemia  - improved    PVD  H/o bilateral AKAs    Anemia  - iron sat 39%, normal b12 and folate    Called and updated Niece/POA Ammy    Expected Discharge Location and  Transportation:   Expected Discharge   Expected Discharge Date: 6/17/2024; Expected Discharge Time:      VTE Prophylaxis:  Pharmacologic & mechanical VTE prophylaxis orders are present.         AM-PAC 6 Clicks Score (PT): 10 (06/16/24 0737)    CODE STATUS:   Code Status and Medical Interventions:   Ordered at: 06/14/24 0604     Code Status (Patient has no pulse and is not breathing):    CPR (Attempt to Resuscitate)     Medical Interventions (Patient has pulse or is breathing):    Full Support       Víctor José MD  06/17/24

## 2024-06-17 NOTE — PROGRESS NOTES
[facility] Cardiology  Inpatient Progress Note      Chief Complaint/Reason for consult:    Chief Complaint   Patient presents with    Shortness of Breath            Subjective  No complaints today, maintaining sats on RA, cousin visiting         Vital Sign Min/Max for last 24 hours  Temp  Min: 98 °F (36.7 °C)  Max: 99.2 °F (37.3 °C)   BP  Min: 152/75  Max: 166/80   Pulse  Min: 76  Max: 87   Resp  Min: 16  Max: 20   SpO2  Min: 93 %  Max: 99 %   Flow (L/min)  Min: 2  Max: 3      Intake/Output Summary (Last 24 hours) at 2024 1120  Last data filed at 2024 2314  Gross per 24 hour   Intake 150 ml   Output 950 ml   Net -800 ml           Gen: well developed, sitting up in bed, comfortable appearing  HEENT: MMM, sclerae anicteric, conjunctivae normal  CV: regular rate, regular rhythm, no murmurs or rubs, normal S1, S2. 2+ radial and DP pulses  Pulm: RA, normal work of breathing, no wheezes, rales, rhonchi  Abd: soft, nontender, nondistended  Ext: bilateral AKA, no dependent edema  Neuro: alert, oriented to self alone, face symmetrical    Tele:  SR    Results Review (reviewed the patient's recent labs in the electronic medical record):     EK/14 - SR, LAE, non-specific ST / TW changes    Results for orders placed during the hospital encounter of 24    Adult Transthoracic Echo Complete W/ Cont if Necessary Per Protocol    Interpretation Summary    Left ventricular ejection fraction appears to be 51 - 55%.    Left ventricular wall thickness is consistent with mild concentric hypertrophy.    AV sclerosis       Radiology: No radiology results for the last day    Lab Results   Component Value Date    WBC 10.10 2024    HGB 10.5 (L) 2024    HCT 33.1 (L) 2024    MCV 81.5 2024     2024     Lab Results   Component Value Date    GLUCOSE 98 2024    CALCIUM 9.0 2024     (L) 2024    K 4.5 2024    CO2 25.0 2024    CL 99 2024     BUN 28 (H) 06/17/2024    CREATININE 1.88 (H) 06/17/2024    EGFRIFAFRI 46 (L) 02/23/2021    EGFRIFNONA 40 (L) 02/23/2021    BCR 14.9 06/17/2024    ANIONGAP 11.0 06/17/2024     Lab Results   Component Value Date    TROPONINT 117 (C) 06/14/2024    TROPONINT 121 (C) 06/14/2024    TROPONINT 117 (C) 06/14/2024      Lab Results   Component Value Date    PROBNP 4,208.0 (H) 06/14/2024     Lab Results   Component Value Date    HGBA1C 5.30 06/14/2024      Lab Results   Component Value Date    CHLPL 190 01/28/2021    TRIG 150 (H) 01/28/2021    HDL 27 (L) 01/28/2021     (H) 01/28/2021      Assessment     Lee Saintmartin is a 68 y.o. with a history of PAD with bilateral AKA, CVA, pulmonary fibrosis on 3L home oxygen, HTN, DM, dementia who was admitted via the ED overnight with reported dyspnea. Cardiology was consulted for multiple things including possible CHF, elevated troponin, and history of AF (pt currently in SR)     Elevated troponin   - no chest pain, no acute ischemic changes on ECG   - suspect non-ischemic myocardial injury in the setting of renal failure and acute on chronic hypoxemic respiratory failure. No chest pain concerning for ACS at this time     Acute on chronic HFpEF   - respiratory status doing well now, diuretics PRN   - TTE with low normal EF, pseudonormal filling with high LAP     Hx PAF   - currently SR   - AC held     Hx of PAD with bilateral AKA   - continue statin, AC, aspirin    Cardiology will follow-up Wednesday, please call with questions.     MIREYA Courtney MD  6/17/2024  11:20 EDT

## 2024-06-17 NOTE — PLAN OF CARE
Goal Outcome Evaluation:  Plan of Care Reviewed With: patient           Outcome Evaluation: PT eval completed. Pt presents below baseline function d/t generalized weakness and decreased activity tolerance. Pt performed rolling in bed w/ maxA x2. Pt would benefit from skilled IP PT. Recommend return to nursing facility at d/c.      Anticipated Discharge Disposition (PT): extended care facility, other (see comments) (return to [facility])

## 2024-06-17 NOTE — CONSULTS
"[facility] Neurology  Consult Note    Patient Name: Lee Saintmartin  : 1955  MRN: [id number]  Primary Care Physician:  Vadim Valadez MD  Referring Physician: Lincoln Silveira MD  Date of admission: 2024    Subjective     Reason for Consult/ Chief Complaint: Confusion worse than baseline per family.  Recent fall, possible underlying memory disorder    Lee Saintmartin is a 68 y.o. male past medical history of A-fib on Eliquis, chronic HFpEF, HTN, HLD, T2DM, CKD stage III, previous CVA, vascular dementia, bipolar disorder, PVD, bilateral AKA, morbid obesity who presented to [facility] ED with complaint of shortness of breath and hypoxia.  Patient was noted to have elevated troponins on admission, elevated proBNP, chest x-ray concerning for vascular congestion along with pleural effusion.  Patient was given Lasix IV and Solu-Medrol.  Patient then required BiPAP therapy for hypoxia and increased work of breathing.    Per chart review patient fell from bed approximately 1 week ago striking his head.  CT head at [facility] was unremarkable per chart review.    Upon assessment patient is disoriented to situation and year.  He was oriented to himself and knew he was at [facility] but thought it was an \"[facility]\".  Spoke with patient's POA Ms. Ammy Garber at number listed in chart.  She states that patient has been diagnosed with vascular dementia for approximately 8 years.  She was unable to confirm current medication regimen treatment.  States that he is able to conversation at baseline but is very forgetful.    Per bedside RN patient did not rest well overnight.    Review Of Systems   Difficult to assess due to baseline mentation    Personal History     Past Medical History:   Diagnosis Date    Anemia     Atrial fibrillation     Dementia     Diabetes mellitus     GERD (gastroesophageal reflux disease)     Hyperlipidemia     Hypertension     Major depressive disorder     " Pulmonary fibrosis     PVD (peripheral vascular disease)     Renal disorder     Stroke        Past Surgical History:   Procedure Laterality Date    ABOVE KNEE AMPUTATION Bilateral        Family History: family history is not on file. Otherwise pertinent FHx was reviewed and not pertinent to current issue.    Social History:  reports that he has never smoked. He does not have any smokeless tobacco history on file. He reports that he does not currently use alcohol. He reports that he does not use drugs.    Home Medications:   NIFEdipine XL, PARoxetine, amLODIPine, apixaban, atorvastatin, carvedilol, doxazosin, gabapentin, insulin glargine, lamoTRIgine, and lisinopril    Current Medications:     Current Facility-Administered Medications:     acetaminophen (TYLENOL) tablet 650 mg, 650 mg, Oral, Q4H PRN, Shaggy Liu PA-C    apixaban (ELIQUIS) tablet 5 mg, 5 mg, Oral, Q12H, Jake Rolle MD, 5 mg at 06/17/24 0920    aspirin chewable tablet 81 mg, 81 mg, Oral, Daily, Jake Rlole MD, 81 mg at 06/17/24 0920    atorvastatin (LIPITOR) tablet 40 mg, 40 mg, Oral, Nightly, Jake Rolle MD, 40 mg at 06/15/24 2056    sennosides-docusate (PERICOLACE) 8.6-50 MG per tablet 2 tablet, 2 tablet, Oral, BID, 2 tablet at 06/17/24 0920 **AND** polyethylene glycol (MIRALAX) packet 17 g, 17 g, Oral, Daily PRN, 17 g at 06/15/24 0032 **AND** bisacodyl (DULCOLAX) EC tablet 5 mg, 5 mg, Oral, Daily PRN **AND** bisacodyl (DULCOLAX) suppository 10 mg, 10 mg, Rectal, Daily PRN, Alma, Dina, APRN    budesonide (PULMICORT) nebulizer solution 0.5 mg, 0.5 mg, Nebulization, BID - RT, Víctor José MD, 0.5 mg at 06/17/24 0753    carvedilol (COREG) tablet 25 mg, 25 mg, Oral, BID With Meals, Jake Rolle MD, 25 mg at 06/17/24 0925    dextrose (D50W) (25 g/50 mL) IV injection 25 g, 25 g, Intravenous, Q15 Min PRN, Shaggy Liu PA-C    dextrose (GLUTOSE) oral gel 15 g, 15 g, Oral, Q15 Min PRN,  Shaggy Liu PA-C    glucagon (GLUCAGEN) injection 1 mg, 1 mg, Intramuscular, Q15 Min PRN, Shaggy Liu PA-C    Insulin Lispro (humaLOG) injection 3-14 Units, 3-14 Units, Subcutaneous, TID With Meals, Shaggy Liu PA-C, 5 Units at 06/14/24 2232    iopamidol (ISOVUE-300) 61 % injection 85 mL, 85 mL, Intravenous, Once in imaging, Jake Rolle MD    ipratropium-albuterol (DUO-NEB) nebulizer solution 3 mL, 3 mL, Nebulization, Q4H PRN, Jake Rolle MD    ipratropium-albuterol (DUO-NEB) nebulizer solution 3 mL, 3 mL, Nebulization, 4x Daily - RT, Víctor José MD, 3 mL at 06/17/24 0753    lamoTRIgine (LaMICtal) tablet 25 mg, 25 mg, Oral, Daily, Jake Rolle MD, 25 mg at 06/17/24 0920    Magnesium Low Dose Replacement - Follow Nurse / BPA Driven Protocol, , Does not apply, PRN, Jake Rolle MD    melatonin tablet 5 mg, 5 mg, Oral, Nightly PRN, Shaggy Liu PA-C, 5 mg at 06/16/24 2012    nitroglycerin (NITROSTAT) SL tablet 0.4 mg, 0.4 mg, Sublingual, Q5 Min PRN, Shaggy Liu PA-C    pantoprazole (PROTONIX) EC tablet 40 mg, 40 mg, Oral, Q AM, Jake Rolle MD, 40 mg at 06/16/24 0513    sodium chloride 0.9 % flush 10 mL, 10 mL, Intravenous, PRN, Lincoln Silveira MD    sodium chloride 0.9 % flush 10 mL, 10 mL, Intravenous, Q12H, Shaggy Liu PA-C, 10 mL at 06/16/24 0908    sodium chloride 0.9 % infusion 40 mL, 40 mL, Intravenous, PRN, Shaggy Liu PA-C    thiamine (B-1) injection 200 mg, 200 mg, Intravenous, Daily, Víctor José MD     Allergies:  No Known Allergies    Objective     Physical Exam  Vitals and nursing note reviewed.   Constitutional:       General: He is not in acute distress.     Appearance: He is not ill-appearing.   Eyes:      General: No visual field deficit.     Extraocular Movements: Extraocular movements intact.      Pupils: Pupils are equal, round, and reactive to light.       "Comments: No nystagmus or deviated gaze noted   Neurological:      Mental Status: He is alert. He is disoriented.      Cranial Nerves: No cranial nerve deficit, dysarthria or facial asymmetry.      Sensory: No sensory deficit.      Motor: No weakness or seizure activity.      Coordination: Finger-Nose-Finger Test normal.      Deep Tendon Reflexes:      Reflex Scores:       Bicep reflexes are 2+ on the right side and 2+ on the left side.     Comments: Cranial Nerves   CN II: Pupils are equal, round, and reactive to light. Normal visual acuity and visual fields.    CN III IV VI: Extraocular movements are full without nystagmus.  CN V: Normal facial sensation and strength of muscles of mastication.  CN VII: Facial movements are symmetric. No weakness.  CN VIII:  Auditory acuity is normal.  CN IX & X:  Symmetric palatal movement.  CN XI: Sternocleidomastoid and trapezius are normal.  No weakness.  CN XII: The tongue is midline.  No atrophy or fasciculations.    Unable to assess lower extremity reflexes due to AKA         Vitals:  Temp:  [98 °F (36.7 °C)-99.2 °F (37.3 °C)] 98 °F (36.7 °C)  Heart Rate:  [76-87] 81  Resp:  [16-20] 16  BP: (152-166)/(75-91) 166/80  Flow (L/min):  [2-3] 2    Laboratory Results:   Lab Results   Component Value Date    GLUCOSE 98 06/17/2024    CALCIUM 9.0 06/17/2024     (L) 06/17/2024    K 4.5 06/17/2024    CO2 25.0 06/17/2024    CL 99 06/17/2024    BUN 28 (H) 06/17/2024    CREATININE 1.88 (H) 06/17/2024    EGFRIFAFRI 46 (L) 02/23/2021    EGFRIFNONA 40 (L) 02/23/2021    BCR 14.9 06/17/2024    ANIONGAP 11.0 06/17/2024     Lab Results   Component Value Date    WBC 10.10 06/17/2024    HGB 10.5 (L) 06/17/2024    HCT 33.1 (L) 06/17/2024    MCV 81.5 06/17/2024     06/17/2024     No results found for: \"CHOL\"  Lab Results   Component Value Date    HDL 27 (L) 01/28/2021    HDL 23 (L) 10/17/2020    HDL 10 (L) 10/01/2020     Lab Results   Component Value Date     (H) 01/28/2021    LDL " 25 10/17/2020    LDL 27 10/01/2020     Lab Results   Component Value Date    TRIG 150 (H) 01/28/2021    TRIG 86 10/17/2020    TRIG 98 10/01/2020     Lab Results   Component Value Date    HGBA1C 5.30 06/14/2024     Lab Results   Component Value Date    INR 1.10 03/11/2023    INR 1.78 (H) 12/07/2020    INR 3.18 (H) 11/28/2020    PROTIME 11.9 03/11/2023    PROTIME 20.4 (H) 12/07/2020    PROTIME 32.4 (H) 11/28/2020     Lab Results   Component Value Date    AFUAYCYY56 727 06/14/2024     Lab Results   Component Value Date    FOLATE >20.00 06/14/2024             Assessment / Plan   Brief Patient Summary:  Lee Saintmartin is a 68 y.o. male past medical history of A-fib on Eliquis, chronic HFpEF, HTN, HLD, T2DM, CKD stage III, previous CVA, vascular dementia, bipolar disorder, PVD, bilateral AKA, morbid obesity who presented to [facility] ED with complaint of shortness of breath and hypoxia.  Patient continues with altered mentation, sleep disturbances and questionable memory issues.    Plan:   Previous CVA  Questionable vascular dementia  Delirium  Bipolar disorder  Altered mental status  Sleep deprivation  CT head with chronic microvascular changes noted.  No signs of acute abnormality.  Old right cerebral infarct noted.  MRI brain pending  EEG in a.m.  Continue home Lamictal 25 mg 3 times daily for bipolar disorder  Vitamin B12, TSH and folate within normal limits  Continue high-dose thiamine 200 mg every 8 hours for 5 days followed by 100 mg daily  Continue home gabapentin at reduced dose of 100 mg 3 times daily  Melatonin 5 mg nightly  Trial of Aricept 5 mg nightly  Patient to work with PT/OT  IM Geodon for extreme agitation  General neurology will continue to follow    I have discussed the above with the patient, bedside RN and Dr. José  Time spent with patient: 80 minutes in face-to-face evaluation and management of the patient.       BRADY Jacome

## 2024-06-18 ENCOUNTER — APPOINTMENT (OUTPATIENT)
Dept: NEUROLOGY | Facility: HOSPITAL | Age: 69
End: 2024-06-18
Payer: MEDICARE

## 2024-06-18 LAB
ANION GAP SERPL CALCULATED.3IONS-SCNC: 9 MMOL/L (ref 5–15)
BUN SERPL-MCNC: 29 MG/DL (ref 8–23)
BUN/CREAT SERPL: 13.2 (ref 7–25)
CALCIUM SPEC-SCNC: 9.1 MG/DL (ref 8.6–10.5)
CHLORIDE SERPL-SCNC: 105 MMOL/L (ref 98–107)
CO2 SERPL-SCNC: 25 MMOL/L (ref 22–29)
CREAT SERPL-MCNC: 2.2 MG/DL (ref 0.76–1.27)
DEPRECATED RDW RBC AUTO: 47 FL (ref 37–54)
EGFRCR SERPLBLD CKD-EPI 2021: 31.4 ML/MIN/1.73
ERYTHROCYTE [DISTWIDTH] IN BLOOD BY AUTOMATED COUNT: 15.4 % (ref 12.3–15.4)
GLUCOSE BLDC GLUCOMTR-MCNC: 107 MG/DL (ref 70–130)
GLUCOSE BLDC GLUCOMTR-MCNC: 125 MG/DL (ref 70–130)
GLUCOSE BLDC GLUCOMTR-MCNC: 154 MG/DL (ref 70–130)
GLUCOSE BLDC GLUCOMTR-MCNC: 97 MG/DL (ref 70–130)
GLUCOSE SERPL-MCNC: 106 MG/DL (ref 65–99)
HCT VFR BLD AUTO: 35.1 % (ref 37.5–51)
HGB BLD-MCNC: 10.7 G/DL (ref 13–17.7)
MCH RBC QN AUTO: 25.7 PG (ref 26.6–33)
MCHC RBC AUTO-ENTMCNC: 30.5 G/DL (ref 31.5–35.7)
MCV RBC AUTO: 84.2 FL (ref 79–97)
PLATELET # BLD AUTO: 145 10*3/MM3 (ref 140–450)
PMV BLD AUTO: 12.9 FL (ref 6–12)
POTASSIUM SERPL-SCNC: 4.6 MMOL/L (ref 3.5–5.2)
RBC # BLD AUTO: 4.17 10*6/MM3 (ref 4.14–5.8)
SODIUM SERPL-SCNC: 139 MMOL/L (ref 136–145)
WBC NRBC COR # BLD AUTO: 9.55 10*3/MM3 (ref 3.4–10.8)

## 2024-06-18 PROCEDURE — 95819 EEG AWAKE AND ASLEEP: CPT | Performed by: PSYCHIATRY & NEUROLOGY

## 2024-06-18 PROCEDURE — 99232 SBSQ HOSP IP/OBS MODERATE 35: CPT | Performed by: INTERNAL MEDICINE

## 2024-06-18 PROCEDURE — 92610 EVALUATE SWALLOWING FUNCTION: CPT | Performed by: SPEECH-LANGUAGE PATHOLOGIST

## 2024-06-18 PROCEDURE — 82948 REAGENT STRIP/BLOOD GLUCOSE: CPT

## 2024-06-18 PROCEDURE — 99233 SBSQ HOSP IP/OBS HIGH 50: CPT

## 2024-06-18 PROCEDURE — 80048 BASIC METABOLIC PNL TOTAL CA: CPT | Performed by: INTERNAL MEDICINE

## 2024-06-18 PROCEDURE — 85027 COMPLETE CBC AUTOMATED: CPT | Performed by: INTERNAL MEDICINE

## 2024-06-18 PROCEDURE — 94799 UNLISTED PULMONARY SVC/PX: CPT

## 2024-06-18 PROCEDURE — 94761 N-INVAS EAR/PLS OXIMETRY MLT: CPT

## 2024-06-18 PROCEDURE — 94664 DEMO&/EVAL PT USE INHALER: CPT

## 2024-06-18 PROCEDURE — 95819 EEG AWAKE AND ASLEEP: CPT

## 2024-06-18 PROCEDURE — 25010000002 THIAMINE HCL 200 MG/2ML SOLUTION

## 2024-06-18 RX ORDER — CARVEDILOL 12.5 MG/1
25 TABLET ORAL 2 TIMES DAILY WITH MEALS
Status: DISCONTINUED | OUTPATIENT
Start: 2024-06-18 | End: 2024-06-19 | Stop reason: HOSPADM

## 2024-06-18 RX ADMIN — Medication 10 ML: at 21:49

## 2024-06-18 RX ADMIN — DONEPEZIL HYDROCHLORIDE 5 MG: 10 TABLET, FILM COATED ORAL at 21:47

## 2024-06-18 RX ADMIN — APIXABAN 5 MG: 5 TABLET, FILM COATED ORAL at 13:47

## 2024-06-18 RX ADMIN — Medication 10 ML: at 13:48

## 2024-06-18 RX ADMIN — SENNOSIDES AND DOCUSATE SODIUM 2 TABLET: 50; 8.6 TABLET ORAL at 13:47

## 2024-06-18 RX ADMIN — ATORVASTATIN CALCIUM 40 MG: 40 TABLET, FILM COATED ORAL at 21:46

## 2024-06-18 RX ADMIN — BUDESONIDE 0.5 MG: 0.5 SUSPENSION RESPIRATORY (INHALATION) at 08:26

## 2024-06-18 RX ADMIN — IPRATROPIUM BROMIDE AND ALBUTEROL SULFATE 3 ML: 2.5; .5 SOLUTION RESPIRATORY (INHALATION) at 12:35

## 2024-06-18 RX ADMIN — THIAMINE HYDROCHLORIDE 200 MG: 100 INJECTION, SOLUTION INTRAMUSCULAR; INTRAVENOUS at 13:46

## 2024-06-18 RX ADMIN — GABAPENTIN 100 MG: 100 CAPSULE ORAL at 15:48

## 2024-06-18 RX ADMIN — IPRATROPIUM BROMIDE AND ALBUTEROL SULFATE 3 ML: 2.5; .5 SOLUTION RESPIRATORY (INHALATION) at 18:53

## 2024-06-18 RX ADMIN — CARVEDILOL 25 MG: 12.5 TABLET, FILM COATED ORAL at 21:48

## 2024-06-18 RX ADMIN — IPRATROPIUM BROMIDE AND ALBUTEROL SULFATE 3 ML: 2.5; .5 SOLUTION RESPIRATORY (INHALATION) at 08:26

## 2024-06-18 RX ADMIN — SENNOSIDES AND DOCUSATE SODIUM 2 TABLET: 50; 8.6 TABLET ORAL at 21:47

## 2024-06-18 RX ADMIN — LAMOTRIGINE 25 MG: 25 TABLET ORAL at 13:47

## 2024-06-18 RX ADMIN — BUDESONIDE 0.5 MG: 0.5 SUSPENSION RESPIRATORY (INHALATION) at 18:53

## 2024-06-18 RX ADMIN — CARVEDILOL 25 MG: 12.5 TABLET, FILM COATED ORAL at 13:51

## 2024-06-18 RX ADMIN — Medication 5 MG: at 21:46

## 2024-06-18 RX ADMIN — IPRATROPIUM BROMIDE AND ALBUTEROL SULFATE 3 ML: 2.5; .5 SOLUTION RESPIRATORY (INHALATION) at 16:05

## 2024-06-18 RX ADMIN — ASPIRIN 81 MG CHEWABLE TABLET 81 MG: 81 TABLET CHEWABLE at 13:48

## 2024-06-18 RX ADMIN — APIXABAN 5 MG: 5 TABLET, FILM COATED ORAL at 21:48

## 2024-06-18 RX ADMIN — LAMOTRIGINE 25 MG: 25 TABLET ORAL at 21:44

## 2024-06-18 RX ADMIN — GABAPENTIN 100 MG: 100 CAPSULE ORAL at 21:47

## 2024-06-18 NOTE — THERAPY EVALUATION
Acute Care - Speech Language Pathology   Swallow Initial Evaluation [facility]  Clinical Swallow Evaluation       Patient Name: Lee Saintmartin  : 1955  MRN: [id number]  Today's Date: 2024               Admit Date: 2024    Visit Dx:     ICD-10-CM ICD-9-CM   1. Acute on chronic systolic congestive heart failure  I50.23 428.23     428.0   2. COPD exacerbation  J44.1 491.21   3. Acute respiratory failure with hypoxia and hypercapnia  J96.01 518.81    J96.02    4. Dysphagia, unspecified type  R13.10 787.20     Patient Active Problem List   Diagnosis    Essential hypertension    Mixed hyperlipidemia    History of CVA (cerebrovascular accident)    Paroxysmal atrial fibrillation    COPD with acute exacerbation    Acute respiratory failure with hypoxia    DOMITILA (acute kidney injury)    Anemia     Past Medical History:   Diagnosis Date    Anemia     Atrial fibrillation     Dementia     Diabetes mellitus     GERD (gastroesophageal reflux disease)     Hyperlipidemia     Hypertension     Major depressive disorder     Pulmonary fibrosis     PVD (peripheral vascular disease)     Renal disorder     Stroke      Past Surgical History:   Procedure Laterality Date    ABOVE KNEE AMPUTATION Bilateral        SLP Recommendation and Plan  SLP Swallowing Diagnosis: oral dysphagia, R/O pharyngeal dysphagia (24 104)  SLP Diet Recommendation: mechanical ground textures, thin liquids (24)  Recommended Precautions and Strategies: upright posture during/after eating, small bites of food and sips of liquid, general aspiration precautions (24)  SLP Rec. for Method of Medication Administration: meds whole, with puree, meds crushed, as tolerated (24)     Monitor for Signs of Aspiration: yes, notify SLP if any concerns (24)  Recommended Diagnostics: other (see comments) (diet tolerance vs further instrumental) (24)  Swallow Criteria for Skilled Therapeutic Interventions  Met: demonstrates skilled criteria (06/18/24 1045)  Anticipated Discharge Disposition (SLP): skilled nursing facility (06/18/24 1045)  Rehab Potential/Prognosis, Swallowing: adequate, monitor progress closely (06/18/24 1045)  Therapy Frequency (Swallow): PRN (06/18/24 1045)  Predicted Duration Therapy Intervention (Days): 1 week (06/18/24 1045)  Oral Care Recommendations: Oral Care BID/PRN, Toothbrush (06/18/24 1045)           Plan of Care Reviewed With: patient  Progress:  (initial eval)      SWALLOW EVALUATION (Last 72 Hours)       SLP Adult Swallow Evaluation       Row Name 06/18/24 1045       Rehab Evaluation    Document Type evaluation  -CJ    Subjective Information no complaints  -CJ    Patient Observations alert;cooperative  -CJ    Patient/Family/Caregiver Comments/Observations no family present  -CJ    Patient Effort good  -CJ    Symptoms Noted During/After Treatment none  -CJ       General Information    Patient Profile Reviewed yes  -CJ    Pertinent History Of Current Problem Pt adm w/ confusion on 6/14; sig h/o HTN, mixed HLD, CVA, COPD, resp failure, DOMITILA, anemia; possible aspiration event  -CJ    Current Method of Nutrition NPO  -CJ    Precautions/Limitations, Vision WFL;for purposes of eval  -CJ    Precautions/Limitations, Hearing WFL;for purposes of eval  -CJ    Prior Level of Function-Communication other (see comments)  dementia per chart  -CJ    Prior Level of Function-Swallowing unknown  -CJ    Plans/Goals Discussed with patient  -CJ    Barriers to Rehab cognitive status  -CJ    Patient's Goals for Discharge return to PO diet  -CJ       Pain    Additional Documentation Pain Scale: FACES Pre/Post-Treatment (Group)  -CJ       Pain Scale: FACES Pre/Post-Treatment    Pain: FACES Scale, Pretreatment 0-->no hurt  -CJ    Posttreatment Pain Rating 0-->no hurt  -CJ       Oral Motor Structure and Function    Dentition Assessment missing teeth  -CJ    Secretion Management WNL/WFL  -CJ    Mucosal Quality moist,  healthy  -       Oral Musculature and Cranial Nerve Assessment    Oral Motor General Assessment generalized oral motor weakness  -       General Eating/Swallowing Observations    Respiratory Support Currently in Use nasal cannula  -    O2 Liters 3L  -CJ    Eating/Swallowing Skills fed by SLP  -    Positioning During Eating upright in bed  -    Utensils Used spoon;cup;straw  -    Consistencies Trialed regular textures;pureed;ice chips;thin liquids;nectar/syrup-thick liquids  -       Clinical Swallow Eval    Oral Prep Phase impaired  -    Pharyngeal Phase --  r/o pharyngeal impairment  -    Clinical Swallow Evaluation Summary Possible aspiration event last pm. Pt seen w/ multiple presentations of thins via spoon/cup and straw, no obvious s/s of aspiration. Pt reports no difficulty. Pt w/ limited acceptance of further solids 2/2 fatigue. Will modify po diet to mechanical grnd per pt request w/ thins and f/u for diet tolerance  -       Oral Prep Concerns    Oral Prep Concerns prolonged mastication  -    Prolonged Mastication regular consistencies  -       SLP Evaluation Clinical Impression    SLP Swallowing Diagnosis oral dysphagia;R/O pharyngeal dysphagia  -    Functional Impact risk of aspiration/pneumonia  -    Rehab Potential/Prognosis, Swallowing adequate, monitor progress closely  -    Swallow Criteria for Skilled Therapeutic Interventions Met demonstrates skilled criteria  -       Recommendations    Therapy Frequency (Swallow) PRN  -    Predicted Duration Therapy Intervention (Days) 1 week  -    SLP Diet Recommendation mechanical ground textures;thin liquids  -    Recommended Diagnostics other (see comments)  diet tolerance vs further instrumental  -    Recommended Precautions and Strategies upright posture during/after eating;small bites of food and sips of liquid;general aspiration precautions  -    Oral Care Recommendations Oral Care BID/PRN;Toothbrush  -    SLP Rec.  for Method of Medication Administration meds whole;with puree;meds crushed;as tolerated  -CJ    Monitor for Signs of Aspiration yes;notify SLP if any concerns  -CJ    Anticipated Discharge Disposition (SLP) [facility] nursing [facility]  -CJ              User Key  (r) = Recorded By, (t) = Taken By, (c) = Cosigned By      Initials Name Effective Dates    Karla Bell, MS CCC-SLP 06/03/24 -                     EDUCATION  The patient has been educated in the following areas:   Dysphagia (Swallowing Impairment) Oral Care/Hydration Modified Diet Instruction.        SLP GOALS       Row Name 06/18/24 1045             (LTG) Patient will demonstrate functional swallow for    Diet Texture (Demonstrate functional swallow) soft to chew (whole) textures  -CJ      Liquid viscosity (Demonstrate functional swallow) thin liquids  -CJ      [place] (Demonstrate functional swallow) with minimal cues (75-90% accuracy)  -CJ      Time Frame (Demonstrate functional swallow) by discharge  -CJ      Progress/Outcomes (Demonstrate functional swallow) new goal  -CJ         (STG) Patient will tolerate trials of    Consistencies Trialed (Tolerate trials) mechanical ground textures;thin liquids  -CJ      Desired Outcome (Tolerate trials) without signs/symptoms of aspiration;without signs of distress;with adequate oral prep/transit/clearance  -CJ      [place] (Tolerate trials) with minimal cues (75-90% accuracy)  -CJ      Time Frame (Tolerate trials) 1 week  -CJ      Progress/Outcomes (Tolerate trials) new goal  -CJ         (STG) Patient will tolerate therapeutic trials of    Consistencies Trialed (Tolerate therapeutic trials) soft to chew (whole) textures;soft to chew (chopped) textures  -CJ      Desired Outcome (Tolerate therapeutic trials) without signs/symptoms of aspiration;with adequate oral prep/transit/clearance  -CJ      [place] (Tolerate therapeutic trials) with minimal cues (75-90% accuracy)  -CJ      Time Frame  (Tolerate therapeutic trials) 1 week  -      Progress/Outcomes (Tolerate therapeutic trials) new goal  -CJ                User Key  (r) = Recorded By, (t) = Taken By, (c) = Cosigned By      Initials Name Provider Type    Karla Bell MS CCC-SLP Speech and Language Pathologist                         Time Calculation:    Time Calculation- SLP       Row Name 06/18/24 1553             Time Calculation- SLP    SLP Start Time 1045  -CJ      SLP Received On 06/18/24  -         Untimed Charges    [id number] Eval Oral Pharyng Swallow Minutes 40  -CJ         Total Minutes    Untimed Charges Total Minutes 40  -CJ       Total Minutes 40  -CJ                User Key  (r) = Recorded By, (t) = Taken By, (c) = Cosigned By      Initials Name Provider Type    Karla Bell MS CCC-SLP Speech and Language Pathologist                    Therapy Charges for Today       Code Description Service Date Service Provider Modifiers Qty    [id number] HC ST EVAL ORAL PHARYNG SWALLOW 3 6/18/2024 Karla Mahoney MS CCC-SLP GN 1                 Karla Mahoney MS CCC-SLP  6/18/2024

## 2024-06-18 NOTE — PROGRESS NOTES
[facility] Medicine Services  PROGRESS NOTE    Patient Name: Lee Saintmartin  : 1955  MRN: [id number]    Date of Admission: 2024  Primary Care Physician: Vadim Valadez MD    Subjective   Subjective     CC: shortness of breath      HPI:  Confused. Resting in bed and did not wake. D/w nursing. Has been combatitive intermittently throughout last 24h      Objective   Objective     Vital Signs:   Temp:  [97.8 °F (36.6 °C)-99.3 °F (37.4 °C)] 98.6 °F (37 °C)  Heart Rate:  [69-85] 85  Resp:  [16-22] 18  BP: (145-168)/(76-87) 162/85  Flow (L/min):  [2-3] 3     Physical Exam:      GEN: NAD, resting in bed, awake  HEENT: on room air, atraumatic, normocephalic  NECK: supple, no masses  RESP: on room air, normal effort  CV: on tele, sinus rhythm  PSYCH: normal affect, appropriate  NEURO: resting in bed, no gross def appreciated   MSK: no edema noted, b/l AKA   SKIN: no rashes noted       Results Reviewed:  LAB RESULTS:      Lab 24  0826 24  1413 24  1412 06/15/24  0523 24  0710 24  0409   WBC 10.10 9.53  --  14.73* 10.55 10.60   HEMOGLOBIN 10.5* 10.3*  --  8.9* 8.9* 8.9*   HEMATOCRIT 33.1* 33.2*  --  28.7* 29.9* 30.5*   PLATELETS 150 144  --  132* 140 137*   NEUTROS ABS 8.10* 7.88*  --   --  9.96* 9.43*   IMMATURE GRANS (ABS) 0.02 0.06*  --   --  0.06*  --    LYMPHS ABS 0.77 0.55*  --   --  0.30*  --    MONOS ABS 0.82 0.73  --   --  0.12  --    EOS ABS 0.34 0.25  --   --  0.08 0.42*   MCV 81.5 83.0  --  82.2 84.5 84.7   PROCALCITONIN  --   --  0.12  --   --   --          Lab 24  0826 24  1412 06/15/24  0523 24  0710 24  0409   SODIUM 135* 137 141 141 139   POTASSIUM 4.5 4.8 5.4* 5.1 5.1   CHLORIDE 99 102 108* 108* 106   CO2 25.0 25.0 25.0 23.0 25.0   ANION GAP 11.0 10.0 8.0 10.0 8.0   BUN 28* 32* 31* 26* 24*   CREATININE 1.88* 1.98* 2.32* 2.29* 2.24*   EGFR 38.0* 35.7* 29.5* 30.0* 30.8*   GLUCOSE 98 120* 129* 142* 129*   CALCIUM  9.0 8.9 8.7 8.8 8.6   MAGNESIUM  --   --  1.6  --   --    HEMOGLOBIN A1C  --   --   --   --  5.30   TSH 2.640  --   --   --   --          Lab 06/17/24  0826 06/15/24  0523 06/14/24  0409   TOTAL PROTEIN 7.3 7.5 7.5   ALBUMIN 3.7 3.6 3.7   GLOBULIN 3.6 3.9 3.8   ALT (SGPT) 15 13 15   AST (SGOT) 35 14 19   BILIRUBIN 0.5 0.3 0.4   ALK PHOS 97 103 102         Lab 06/14/24  0921 06/14/24  0710 06/14/24  0409   PROBNP  --   --  4,208.0*   HSTROP T 117* 121* 117*             Lab 06/14/24  0710 06/14/24  0409   IRON  --  39*   IRON SATURATION (TSAT)  --  15*   TIBC  --  265*   TRANSFERRIN  --  178*   FERRITIN  --  115.10   FOLATE >20.00  --    VITAMIN B 12 727  --          Lab 06/16/24  0329 06/14/24  0433   PH, ARTERIAL 7.388 7.252*   PCO2, ARTERIAL 44.3 55.7*   PO2 ART 86.6 99.9   FIO2 28 36   HCO3 ART 26.7* 24.5   BASE EXCESS ART 1.4 -2.9*   CARBOXYHEMOGLOBIN 1.6 1.5     Brief Urine Lab Results  (Last result in the past 365 days)        Color   Clarity   Blood   Leuk Est   Nitrite   Protein   CREAT   Urine HCG        06/14/24 0918 Yellow   Clear   Negative   Trace   Negative   Negative                   Microbiology Results Abnormal       Procedure Component Value - Date/Time    COVID PRE-OP / PRE-PROCEDURE SCREENING ORDER (NO ISOLATION) - Swab, Nasopharynx [[id number]]  (Normal) Collected: 06/14/24 0416    Lab Status: Final result Specimen: Swab from Nasopharynx Updated: 06/14/24 0446    Narrative:      The following orders were created for panel order COVID PRE-OP / PRE-PROCEDURE SCREENING ORDER (NO ISOLATION) - Swab, Nasopharynx.  Procedure                               Abnormality         Status                     ---------                               -----------         ------                     COVID-19 and FLU A/B PCR...[[id number]]  Normal              Final result                 Please view results for these tests on the individual orders.    COVID-19 and FLU A/B PCR, 1 HR TAT - Swab, Nasopharynx [[id number]]   (Normal) Collected: 06/14/24 0416    Lab Status: Final result Specimen: Swab from Nasopharynx Updated: 06/14/24 0446     COVID19 Not Detected     Influenza A PCR Not Detected     Influenza B PCR Not Detected    Narrative:      Fact sheet for providers: https://www.fda.gov/media/429129/download    Fact sheet for patients: https://www.fda.gov/media/315912/download    Test performed by PCR.            MRI Brain Without Contrast    Result Date: 6/18/2024  MRI BRAIN WO CONTRAST Date of Exam: 6/18/2024 12:07 AM EDT Indication: slurred speech, confusion.  Comparison: Head CT 6/14/2024. Technique:  Routine multiplanar/multisequence sequence images of the brain were obtained without contrast administration. Findings: There is no diffusion restriction to suggest acute infarct. There is a chronic infarct in the inferior right cerebellum. There is moderate generalized parenchymal volume loss. There is patchy FLAIR hyperintense signal within the phuc and extensively throughout the white matter. There is no evidence of acute intracranial hemorrhage. Orbits appear unremarkable. There is a partially empty sella turcica. There is marked thinning of the corpus callosum. The paranasal sinuses and mastoid air cells appear well aerated. Major arterial flow voids appear intact. Calvarial and superficial soft tissue signal is within normal limits. Temporomandibular joints and parotid glands appear symmetric. There is no mass effect, midline shift or abnormal extra-axial collection.     Impression: Impression: 1.No acute intracranial abnormality. 2.Moderate generalized parenchymal volume loss and advanced chronic small vessel ischemic change. 3.Chronic infarct in the inferior right cerebellum. Electronically Signed: Kwan Gutierrez MD  6/18/2024 4:19 AM EDT  Workstation ID: [id number]    XR Abdomen KUB    Result Date: 6/17/2024  XR ABDOMEN KUB Date of Exam: 6/17/2024 11:32 AM EDT Indication: MRI clearance Comparison: None available. Findings:  3 supine views. There is some artifact on the patient. No radiopaque foreign bodies within the patient are identified. The bowel gas pattern is normal. There are vascular calcifications in the pelvis.     Impression: Impression: No radiopaque foreign body within the abdomen or pelvis. Electronically Signed: Danya Bonds MD  6/17/2024 12:09 PM EDT  Workstation ID: [id number]     Results for orders placed during the hospital encounter of 06/14/24    Adult Transthoracic Echo Complete W/ Cont if Necessary Per Protocol    Interpretation Summary    Left ventricular ejection fraction appears to be 51 - 55%.    Left ventricular wall thickness is consistent with mild concentric hypertrophy.    AV sclerosis      Current medications:  Scheduled Meds:apixaban, 5 mg, Oral, Q12H  aspirin, 81 mg, Oral, Daily  atorvastatin, 40 mg, Oral, Nightly  budesonide, 0.5 mg, Nebulization, BID - RT  carvedilol, 25 mg, Oral, BID With Meals  donepezil, 5 mg, Oral, Nightly  gabapentin, 100 mg, Oral, TID  insulin lispro, 3-14 Units, Subcutaneous, TID With Meals  iopamidol, 85 mL, Intravenous, Once in imaging  ipratropium-albuterol, 3 mL, Nebulization, 4x Daily - RT  lamoTRIgine, 25 mg, Oral, Q8H  melatonin, 5 mg, Oral, Nightly  pantoprazole, 40 mg, Oral, Q AM  senna-docusate sodium, 2 tablet, Oral, BID  sodium chloride, 10 mL, Intravenous, Q12H  thiamine (B-1) IV, 200 mg, Intravenous, Daily   Followed by  [START ON 6/22/2024] thiamine, 100 mg, Oral, Daily      Continuous Infusions:   PRN Meds:.  acetaminophen    senna-docusate sodium **AND** polyethylene glycol **AND** bisacodyl **AND** bisacodyl    dextrose    dextrose    glucagon (human recombinant)    ipratropium-albuterol    Magnesium Low Dose Replacement - Follow Nurse / BPA Driven Protocol    nitroglycerin    ondansetron    sodium chloride    sodium chloride    ziprasidone    Assessment & Plan   Assessment & Plan     Active Hospital Problems    Diagnosis  POA    Essential hypertension  [I10]  Yes    Mixed hyperlipidemia [E78.2]  Yes    History of CVA (cerebrovascular accident) [Z86.73]  Not Applicable    Paroxysmal atrial fibrillation [I48.0]  Yes    COPD with acute exacerbation [J44.1]  Yes    Acute respiratory failure with hypoxia [J96.01]  Unknown    DOMITILA (acute kidney injury) [N17.9]  Yes    Anemia [D64.9]  Yes      Resolved Hospital Problems   No resolved problems to display.        Brief Hospital Course to date:  Lee Saintmartin is a 68 y.o. male with history of PVD s/p bilateral AKAs, CKD 3-4, atrial fibrillation on eliquis, CVA, vascular dementia, concern for Bipolar Disorder, possible COPD, chronic anemia, DMII, stroke with residual left hemiplegia, dysphagia hypercarbic/hypoxic respiratory failure presents with shortness of breath and hypoxia.  In ED CXR consistent with vascular congestion, proBNP and troponin elevated. Patient given lasix, solumedrol and started on BiPAP.    Acute Respiratory Failure with hypoxia   Acute on Chronic HFpEF  -received IV lasix in ED on admission, further diuresis as needed  -scheduled duonebs and budesonide  - COVID and influenza pcr negative.    - BiPAP HS and PRN  - acidosis resolved by ABG   -- continues to require around 3L     PAF  - eliquis    H/o CVA  - asa    Delirium  Dementia?  - patient confusion continues today, intermittent combatitiveness  - per my partner-->cousin at bedside yesterday stated patient is not his normal self.  Cousin describes fall from bed approx one week ago with head strike.  CT head at [facility] ED unremarkable, and CT head imaging performed here at admission without acute findings.  My partner did speak with the patient's niece, and she says she noted some slurred speech on Monday (prior to admission).    - MRI brain done with no acute abnl, moderate volume loss and adv small vessel dz, chronic strokee in inf right cerebellum  - TSH normal   - consider checking lamictal level  - IV thiamine  - Neurology consultation- have d/w  them. They do not recommend any further testing and would be ok with home discharge as favor this is component of hospital delirium complicating vascular dementia     Bipolar disorder  - on lamictal     DMII    CKD 3-4  - baseline creatinine 2.3 to 2.6  - creatinine 1.8    Hyperkalemia  - improved    PVD  H/o bilateral AKAs    Anemia  - iron sat 39%, normal b12 and folate        Expected Discharge Location and Transportation: back to [facility] when able   Expected Discharge   Expected Discharge Date: 6/17/2024; Expected Discharge Time:      VTE Prophylaxis:  Pharmacologic & mechanical VTE prophylaxis orders are present.         AM-PAC 6 Clicks Score (PT): 8 (06/17/24 0291)    CODE STATUS:   Code Status and Medical Interventions:   Ordered at: 06/14/24 0604     Code Status (Patient has no pulse and is not breathing):    CPR (Attempt to Resuscitate)     Medical Interventions (Patient has pulse or is breathing):    Full Support       Liliana Gardner MD  06/18/24

## 2024-06-18 NOTE — PLAN OF CARE
Goal Outcome Evaluation:  Plan of Care Reviewed With: patient        Progress:  (initial eval)         Anticipated Discharge Disposition (SLP): skilled nursing facility          SLP Swallowing Diagnosis: oral dysphagia, R/O pharyngeal dysphagia (06/18/24 1967)

## 2024-06-18 NOTE — PROGRESS NOTES
[facility] Neurology    Progress Note    Patient Name: Lee Saintmartin  : 1955  MRN: [id number]  Primary Care Physician:  Vadim Valadez MD  Date of admission: 2024    Subjective     Chief Complaint: Confusion worse than baseline.    History of Present Illness   Patient seen resting comfortably in exam.  He was reluctant to participate with exam.  Was able to state his name and location.  Per conversation had with patient's family members today he remains at his cognitive baseline.      Review of Systems   General: Negative for fever, nausea, or vomiting.   Neurological: Negative for headache, pain, or weakness.     Objective     Physical Exam  Vitals and nursing note reviewed.   Constitutional:       General: He is not in acute distress.     Appearance: He is not ill-appearing.   Eyes:      Extraocular Movements: Extraocular movements intact.      Pupils: Pupils are equal, round, and reactive to light.   Neurological:      Mental Status: He is alert. Mental status is at baseline.      Cranial Nerves: No cranial nerve deficit, dysarthria or facial asymmetry.      Sensory: No sensory deficit.      Motor: No weakness or seizure activity.      Coordination: Finger-Nose-Finger Test normal.      Deep Tendon Reflexes:      Reflex Scores:       Bicep reflexes are 2+ on the right side and 2+ on the left side.     Comments:         Cranial Nerves   CN II: Pupils are equal, round, and reactive to light. Normal visual acuity and visual fields.    CN III IV VI: Extraocular movements are full without nystagmus.  CN V: Normal facial sensation and strength of muscles of mastication.  CN VII: Facial movements are symmetric. No weakness.  CN VIII:  Auditory acuity is normal.  CN IX & X:  Symmetric palatal movement.  CN XI: Sternocleidomastoid and trapezius are normal.  No weakness.  CN XII: The tongue is midline.  No atrophy or fasciculations.            Vitals:   Temp:  [97.8 °F (36.6 °C)-99.3 °F (37.4 °C)]  98.6 °F (37 °C)  Heart Rate:  [69-85] 85  Resp:  [16-22] 18  BP: (145-168)/(76-87) 162/85  Flow (L/min):  [2-3] 3    Current Medications    Current Facility-Administered Medications:     acetaminophen (TYLENOL) tablet 650 mg, 650 mg, Oral, Q4H PRN, Shaggy Liu PA-C    apixaban (ELIQUIS) tablet 5 mg, 5 mg, Oral, Q12H, Jake Rolle MD, 5 mg at 06/17/24 2039    aspirin chewable tablet 81 mg, 81 mg, Oral, Daily, Jake Rolle MD, 81 mg at 06/17/24 0920    atorvastatin (LIPITOR) tablet 40 mg, 40 mg, Oral, Nightly, Jake Rolle MD, 40 mg at 06/17/24 2039    sennosides-docusate (PERICOLACE) 8.6-50 MG per tablet 2 tablet, 2 tablet, Oral, BID, 2 tablet at 06/17/24 0920 **AND** polyethylene glycol (MIRALAX) packet 17 g, 17 g, Oral, Daily PRN, 17 g at 06/15/24 0032 **AND** bisacodyl (DULCOLAX) EC tablet 5 mg, 5 mg, Oral, Daily PRN **AND** bisacodyl (DULCOLAX) suppository 10 mg, 10 mg, Rectal, Daily PRN, Alma, Dina, APRN    budesonide (PULMICORT) nebulizer solution 0.5 mg, 0.5 mg, Nebulization, BID - RT, Víctor José MD, 0.5 mg at 06/17/24 1936    carvedilol (COREG) tablet 25 mg, 25 mg, Oral, BID With Meals, Jake Rolle MD, 25 mg at 06/17/24 1828    dextrose (D50W) (25 g/50 mL) IV injection 25 g, 25 g, Intravenous, Q15 Min PRN, Shaggy Liu PA-C    dextrose (GLUTOSE) oral gel 15 g, 15 g, Oral, Q15 Min PRN, Shaggy Liu PA-C    donepezil (ARICEPT) tablet 5 mg, 5 mg, Oral, Nightly, Omid Aparicio MD, 5 mg at 06/17/24 2039    gabapentin (NEURONTIN) capsule 100 mg, 100 mg, Oral, TID, Omid Aparicio MD, 100 mg at 06/17/24 2039    glucagon (GLUCAGEN) injection 1 mg, 1 mg, Intramuscular, Q15 Min PRN, Shaggy Liu PA-C    Insulin Lispro (humaLOG) injection 3-14 Units, 3-14 Units, Subcutaneous, TID With Meals, Shaggy Liu PA-C, 5 Units at 06/14/24 2232    iopamidol (ISOVUE-300) 61 % injection 85 mL, 85 mL, Intravenous, Once  in imaging, Jake Rolle MD    ipratropium-albuterol (DUO-NEB) nebulizer solution 3 mL, 3 mL, Nebulization, Q4H PRN, Jake Rolle MD    ipratropium-albuterol (DUO-NEB) nebulizer solution 3 mL, 3 mL, Nebulization, 4x Daily - RT, Víctor José MD, 3 mL at 06/17/24 1936    lamoTRIgine (LaMICtal) tablet 25 mg, 25 mg, Oral, Q8H, Omid Aparicio MD, 25 mg at 06/17/24 2038    Magnesium Low Dose Replacement - Follow Nurse / BPA Driven Protocol, , Does not apply, PRN, Jake Rolle MD    melatonin tablet 5 mg, 5 mg, Oral, Nightly, Yeimi Salguero, APRN, 5 mg at 06/17/24 2039    nitroglycerin (NITROSTAT) SL tablet 0.4 mg, 0.4 mg, Sublingual, Q5 Min PRN, Shaggy Liu PA-C    ondansetron (ZOFRAN) injection 4 mg, 4 mg, Intravenous, Q6H PRN, Chery Martinez PA-C, 4 mg at 06/17/24 2224    pantoprazole (PROTONIX) EC tablet 40 mg, 40 mg, Oral, Q AM, Jake Rolle MD, 40 mg at 06/16/24 0513    sodium chloride 0.9 % flush 10 mL, 10 mL, Intravenous, PRN, Lincoln Silveira MD    sodium chloride 0.9 % flush 10 mL, 10 mL, Intravenous, Q12H, Shaggy Liu PA-C, 10 mL at 06/17/24 2042    sodium chloride 0.9 % infusion 40 mL, 40 mL, Intravenous, PRN, Shaggy Liu PA-C    thiamine (B-1) injection 200 mg, 200 mg, Intravenous, Daily **FOLLOWED BY** [START ON 6/22/2024] thiamine (VITAMIN B-1) tablet 100 mg, 100 mg, Oral, Daily, Yeimi Salguero, APRN    ziprasidone (GEODON) injection 10 mg, 10 mg, Intramuscular, Q4H PRN, Yeimi Salguero K, APRN    Laboratory Results:   Lab Results   Component Value Date    GLUCOSE 98 06/17/2024    CALCIUM 9.0 06/17/2024     (L) 06/17/2024    K 4.5 06/17/2024    CO2 25.0 06/17/2024    CL 99 06/17/2024    BUN 28 (H) 06/17/2024    CREATININE 1.88 (H) 06/17/2024    EGFRIFAFRI 46 (L) 02/23/2021    EGFRIFNONA 40 (L) 02/23/2021    BCR 14.9 06/17/2024    ANIONGAP 11.0 06/17/2024     Lab Results   Component Value Date    WBC 10.10 06/17/2024     "HGB 10.5 (L) 06/17/2024    HCT 33.1 (L) 06/17/2024    MCV 81.5 06/17/2024     06/17/2024     No results found for: \"CHOL\"  Lab Results   Component Value Date    HDL 27 (L) 01/28/2021    HDL 23 (L) 10/17/2020    HDL 10 (L) 10/01/2020     Lab Results   Component Value Date     (H) 01/28/2021    LDL 25 10/17/2020    LDL 27 10/01/2020     Lab Results   Component Value Date    TRIG 150 (H) 01/28/2021    TRIG 86 10/17/2020    TRIG 98 10/01/2020     Lab Results   Component Value Date    HGBA1C 5.30 06/14/2024     Lab Results   Component Value Date    INR 1.10 03/11/2023    INR 1.78 (H) 12/07/2020    INR 3.18 (H) 11/28/2020    PROTIME 11.9 03/11/2023    PROTIME 20.4 (H) 12/07/2020    PROTIME 32.4 (H) 11/28/2020     Lab Results   Component Value Date    FOLATE >20.00 06/14/2024     Lab Results   Component Value Date    [id number] 06/14/2024       MRI Brain Without Contrast    Result Date: 6/18/2024  MRI BRAIN WO CONTRAST Date of Exam: 6/18/2024 12:07 AM EDT Indication: slurred speech, confusion.  Comparison: Head CT 6/14/2024. Technique:  Routine multiplanar/multisequence sequence images of the brain were obtained without contrast administration. Findings: There is no diffusion restriction to suggest acute infarct. There is a chronic infarct in the inferior right cerebellum. There is moderate generalized parenchymal volume loss. There is patchy FLAIR hyperintense signal within the phuc and extensively throughout the white matter. There is no evidence of acute intracranial hemorrhage. Orbits appear unremarkable. There is a partially empty sella turcica. There is marked thinning of the corpus callosum. The paranasal sinuses and mastoid air cells appear well aerated. Major arterial flow voids appear intact. Calvarial and superficial soft tissue signal is within normal limits. Temporomandibular joints and parotid glands appear symmetric. There is no mass effect, midline shift or abnormal extra-axial collection. "     Impression: Impression: 1.No acute intracranial abnormality. 2.Moderate generalized parenchymal volume loss and advanced chronic small vessel ischemic change. 3.Chronic infarct in the inferior right cerebellum. Electronically Signed: Kwan Gutierrez MD  6/18/2024 4:19 AM EDT  Workstation ID: [id number]        Assessment / Plan   Brief Patient Summary:  Lee Saintmartin is a 68 y.o. male past medical history of A-fib on Eliquis, chronic HFpEF, HTN, HLD, T2DM, CKD stage III, previous CVA, vascular dementia, bipolar disorder, PVD, bilateral AKA, morbid obesity who presented to [facility] ED with complaint of shortness of breath and hypoxia.  Patient continues with altered mentation, sleep disturbances and questionable memory issues.     Plan:   Previous CVA  Questionable vascular dementia  Delirium  Bipolar disorder  Altered mental status  Sleep deprivation  CT head with chronic microvascular changes noted.  No signs of acute abnormality.  Old right cerebral infarct noted.  MRI brain with no acute abnormalities.  Moderate volume loss and advance chronic microvascular small ischemic changes noted.  Chronic inferior right cerebellar stroke noted.  EEG nonspecific mild diffuse cerebral dysfunction noted.  Commonly seen with toxic/metabolic etiologies  Continue home Lamictal 25 mg 3 times daily for bipolar disorder  Vitamin B12, TSH and folate within normal limits  Continue high-dose thiamine 200 mg every 8 hours for 5 days followed by 100 mg daily  Continue home gabapentin at reduced dose of 100 mg 3 times daily  Continue Melatonin 5 mg nightly  Continue  Aricept 5 mg nightly, no signs of intolerance.  Patient to work with PT/OT  IM Geodon for extreme agitation  Follow-up with BRADY Sanabria  General neurology will continue to follow    I have discussed the above with the patient, bedside RN Dr. Gardner  Time spent with patient: 50 minutes in face-to-face evaluation and management of the patient.    Copied text in this note  has been reviewed and is accurate as of 06/18/24.     Yeimi Salguero, APRN

## 2024-06-18 NOTE — CASE MANAGEMENT/SOCIAL WORK
Continued Stay Note  [facility]     Patient Name: Lee Saintmartin  MRN: [id number]  Today's Date: 6/18/2024    Admit Date: 6/14/2024    Plan: [facility]   Discharge Plan       Row Name 06/18/24 0832       Plan    Plan [facility]    Plan Comments According to Praveena at [facility], patient has a Medicaid bed hold.    Final Discharge Disposition Code 04 - intermediate care facility                   Discharge Codes    No documentation.                 Expected Discharge Date and Time       Expected Discharge Date Expected Discharge Time    Jun 17, 2024               Liliana Benz RN

## 2024-06-19 ENCOUNTER — TELEPHONE (OUTPATIENT)
Dept: NEUROLOGY | Facility: CLINIC | Age: 69
End: 2024-06-19
Payer: MEDICARE

## 2024-06-19 VITALS
HEART RATE: 77 BPM | RESPIRATION RATE: 16 BRPM | HEIGHT: 71 IN | DIASTOLIC BLOOD PRESSURE: 80 MMHG | BODY MASS INDEX: 30.32 KG/M2 | TEMPERATURE: 98.4 F | OXYGEN SATURATION: 98 % | SYSTOLIC BLOOD PRESSURE: 161 MMHG | WEIGHT: 216.6 LBS

## 2024-06-19 LAB — GLUCOSE BLDC GLUCOMTR-MCNC: 114 MG/DL (ref 70–130)

## 2024-06-19 PROCEDURE — 92526 ORAL FUNCTION THERAPY: CPT

## 2024-06-19 PROCEDURE — 94799 UNLISTED PULMONARY SVC/PX: CPT

## 2024-06-19 PROCEDURE — 99239 HOSP IP/OBS DSCHRG MGMT >30: CPT | Performed by: INTERNAL MEDICINE

## 2024-06-19 PROCEDURE — 82948 REAGENT STRIP/BLOOD GLUCOSE: CPT

## 2024-06-19 PROCEDURE — 99233 SBSQ HOSP IP/OBS HIGH 50: CPT

## 2024-06-19 PROCEDURE — 99232 SBSQ HOSP IP/OBS MODERATE 35: CPT | Performed by: INTERNAL MEDICINE

## 2024-06-19 PROCEDURE — 25010000002 THIAMINE HCL 200 MG/2ML SOLUTION

## 2024-06-19 RX ORDER — INSULIN LISPRO 100 [IU]/ML
3-14 INJECTION, SOLUTION INTRAVENOUS; SUBCUTANEOUS
Start: 2024-06-19

## 2024-06-19 RX ORDER — DONEPEZIL HYDROCHLORIDE 5 MG/1
5 TABLET, FILM COATED ORAL NIGHTLY
Start: 2024-06-19

## 2024-06-19 RX ORDER — BISACODYL 10 MG
10 SUPPOSITORY, RECTAL RECTAL DAILY PRN
Start: 2024-06-19

## 2024-06-19 RX ORDER — ASPIRIN 81 MG/1
81 TABLET, CHEWABLE ORAL DAILY
Start: 2024-06-20

## 2024-06-19 RX ORDER — BISACODYL 5 MG/1
5 TABLET, DELAYED RELEASE ORAL DAILY PRN
Start: 2024-06-19

## 2024-06-19 RX ORDER — BUDESONIDE 0.5 MG/2ML
0.5 INHALANT ORAL
Start: 2024-06-19

## 2024-06-19 RX ORDER — PANTOPRAZOLE SODIUM 40 MG/1
40 TABLET, DELAYED RELEASE ORAL
Start: 2024-06-20

## 2024-06-19 RX ORDER — ACETAMINOPHEN 325 MG/1
650 TABLET ORAL EVERY 4 HOURS PRN
Start: 2024-06-19

## 2024-06-19 RX ORDER — IPRATROPIUM BROMIDE AND ALBUTEROL SULFATE 2.5; .5 MG/3ML; MG/3ML
3 SOLUTION RESPIRATORY (INHALATION) EVERY 4 HOURS PRN
Start: 2024-06-19

## 2024-06-19 RX ORDER — GABAPENTIN 100 MG/1
100 CAPSULE ORAL 3 TIMES DAILY
Qty: 9 CAPSULE | Refills: 0 | Status: SHIPPED | OUTPATIENT
Start: 2024-06-19 | End: 2024-06-22

## 2024-06-19 RX ORDER — UREA 10 %
5 LOTION (ML) TOPICAL NIGHTLY
Start: 2024-06-19

## 2024-06-19 RX ORDER — AMOXICILLIN 250 MG
2 CAPSULE ORAL 2 TIMES DAILY
Start: 2024-06-19

## 2024-06-19 RX ORDER — NICOTINE POLACRILEX 4 MG
15 LOZENGE BUCCAL
Start: 2024-06-19

## 2024-06-19 RX ORDER — POLYETHYLENE GLYCOL 3350 17 G/17G
17 POWDER, FOR SOLUTION ORAL DAILY PRN
Start: 2024-06-19

## 2024-06-19 RX ADMIN — BUDESONIDE 0.5 MG: 0.5 SUSPENSION RESPIRATORY (INHALATION) at 06:31

## 2024-06-19 RX ADMIN — GABAPENTIN 100 MG: 100 CAPSULE ORAL at 09:06

## 2024-06-19 RX ADMIN — IPRATROPIUM BROMIDE AND ALBUTEROL SULFATE 3 ML: 2.5; .5 SOLUTION RESPIRATORY (INHALATION) at 06:31

## 2024-06-19 RX ADMIN — APIXABAN 5 MG: 5 TABLET, FILM COATED ORAL at 09:02

## 2024-06-19 RX ADMIN — Medication 10 ML: at 09:06

## 2024-06-19 RX ADMIN — IPRATROPIUM BROMIDE AND ALBUTEROL SULFATE 3 ML: 2.5; .5 SOLUTION RESPIRATORY (INHALATION) at 10:35

## 2024-06-19 RX ADMIN — THIAMINE HYDROCHLORIDE 200 MG: 100 INJECTION, SOLUTION INTRAMUSCULAR; INTRAVENOUS at 09:06

## 2024-06-19 RX ADMIN — ASPIRIN 81 MG CHEWABLE TABLET 81 MG: 81 TABLET CHEWABLE at 09:06

## 2024-06-19 RX ADMIN — CARVEDILOL 25 MG: 12.5 TABLET, FILM COATED ORAL at 09:01

## 2024-06-19 RX ADMIN — LAMOTRIGINE 25 MG: 25 TABLET ORAL at 06:23

## 2024-06-19 RX ADMIN — PANTOPRAZOLE SODIUM 40 MG: 40 TABLET, DELAYED RELEASE ORAL at 06:23

## 2024-06-19 NOTE — DISCHARGE SUMMARY
[facility]  DISCHARGE SUMMARY    Patient Name: Lee Saintmartin  : 1955  MRN: [id number]    Date of Admission: 2024  4:02 AM  Date of Discharge:  24  Primary Care Physician: Vadim Valadez MD    Consults       Date and Time Order Name Status Description    2024  8:59 AM Inpatient Neurology Consult General Completed     2024  6:59 AM Inpatient Cardiology Consult Completed             Hospital Course     Presenting Problem: confusion    Active Hospital Problems    Diagnosis  POA    Essential hypertension [I10]  Yes    Mixed hyperlipidemia [E78.2]  Yes    History of CVA (cerebrovascular accident) [Z86.73]  Not Applicable    Paroxysmal atrial fibrillation [I48.0]  Yes    COPD with acute exacerbation [J44.1]  Yes    Acute respiratory failure with hypoxia [J96.01]  Unknown    DOMITILA (acute kidney injury) [N17.9]  Yes    Anemia [D64.9]  Yes      Resolved Hospital Problems   No resolved problems to display.          Hospital Course:  Lee Saintmartin is a 68 y.o. male with history of PVD s/p bilateral AKAs, CKD 3-4, atrial fibrillation on eliquis, CVA, vascular dementia, concern for Bipolar Disorder, possible COPD, chronic anemia, DMII, stroke with residual left hemiplegia, dysphagia hypercarbic/hypoxic respiratory failure presents with shortness of breath and hypoxia.  In ED CXR consistent with vascular congestion, proBNP and troponin elevated. Patient given lasix, solumedrol and started on BiPAP.     Acute Respiratory Failure with hypoxia   Acute on Chronic HFpEF  -received IV lasix in ED on admission, further diuresis as needed  -scheduled duonebs and budesonide  - COVID and influenza pcr negative.    - BiPAP HS and PRN  - acidosis resolved by ABG   -- weaned to RA at time of discharge, appearing euvolemic      PAF  - eliquis     H/o CVA  - asa     Delirium  Dementia?  - patient confusion ongoing throughout stay, intermittent combatitiveness  - per  my partner-->cousin at bedside stated patient is not his normal self.  Cousin describes fall from bed approx one week ago with head strike.  CT head at [facility] ED unremarkable, and CT head imaging performed here at admission without acute findings.  My partner did speak with the patient's niece, and she says she noted some slurred speech on Monday (prior to admission).    - MRI brain done with no acute abnl, moderate volume loss and adv small vessel dz, chronic strokee in inf right cerebellum  - TSH normal   - IV thiamine--> PO thiamine at discharge   - Neurology consultation- have d/w them. They do not recommend any further testing and would be ok with home discharge as favor this is component of hospital delirium complicating vascular dementia      Bipolar disorder  - on lamictal      DMII  -resume home gabapentin but at slightly reduced dosing      CKD 3-4  - baseline creatinine 2.3 to 2.6  - creatinine 1.8     Hyperkalemia  - improved     PVD  H/o bilateral AKAs     Anemia  - iron sat 39%, normal b12 and folate         Discharge Follow Up Recommendations for outpatient labs/diagnostics:  PCP 1-2 weeks  Neuro [place] Key APRN 1 month     Day of Discharge     HPI:   Doing fine. Nursing at bedside.     Review of Systems  Gen- No fevers, chills  CV- No chest pain, palpitations  Resp- No cough, dyspnea  GI- No N/V/D, abd pain      Vital Signs:   Temp:  [98.4 °F (36.9 °C)-99.1 °F (37.3 °C)] 98.4 °F (36.9 °C)  Heart Rate:  [68-96] 77  Resp:  [16-18] 16  BP: (132-167)/(80-89) 161/80  Flow (L/min):  [2-3] 2      Physical Exam:  GEN: NAD, resting in bed, awake  HEENT: on room air, atraumatic, normocephalic  NECK: supple, no masses  RESP: on room air, normal effort  CV: on tele, sinus rhythm  PSYCH: normal affect, appropriate  NEURO: resting in bed, no gross def appreciated   MSK: no edema noted, b/l AKA   SKIN: no rashes noted          Pertinent  and/or Most Recent Results     LAB RESULTS:      Lab 06/18/24  0854  06/17/24  0826 06/16/24  1413 06/16/24  1412 06/15/24  0523 06/14/24  0710 06/14/24  0409   WBC 9.55 10.10 9.53  --  14.73* 10.55 10.60   HEMOGLOBIN 10.7* 10.5* 10.3*  --  8.9* 8.9* 8.9*   HEMATOCRIT 35.1* 33.1* 33.2*  --  28.7* 29.9* 30.5*   PLATELETS 145 150 144  --  132* 140 137*   NEUTROS ABS  --  8.10* 7.88*  --   --  9.96* 9.43*   IMMATURE GRANS (ABS)  --  0.02 0.06*  --   --  0.06*  --    LYMPHS ABS  --  0.77 0.55*  --   --  0.30*  --    MONOS ABS  --  0.82 0.73  --   --  0.12  --    EOS ABS  --  0.34 0.25  --   --  0.08 0.42*   MCV 84.2 81.5 83.0  --  82.2 84.5 84.7   PROCALCITONIN  --   --   --  0.12  --   --   --          Lab 06/18/24  0854 06/17/24  0826 06/16/24  1412 06/15/24  0523 06/14/24  0710 06/14/24  0409   SODIUM 139 135* 137 141 141 139   POTASSIUM 4.6 4.5 4.8 5.4* 5.1 5.1   CHLORIDE 105 99 102 108* 108* 106   CO2 25.0 25.0 25.0 25.0 23.0 25.0   ANION GAP 9.0 11.0 10.0 8.0 10.0 8.0   BUN 29* 28* 32* 31* 26* 24*   CREATININE 2.20* 1.88* 1.98* 2.32* 2.29* 2.24*   EGFR 31.4* 38.0* 35.7* 29.5* 30.0* 30.8*   GLUCOSE 106* 98 120* 129* 142* 129*   CALCIUM 9.1 9.0 8.9 8.7 8.8 8.6   MAGNESIUM  --   --   --  1.6  --   --    HEMOGLOBIN A1C  --   --   --   --   --  5.30   TSH  --  2.640  --   --   --   --          Lab 06/17/24  0826 06/15/24  0523 06/14/24  0409   TOTAL PROTEIN 7.3 7.5 7.5   ALBUMIN 3.7 3.6 3.7   GLOBULIN 3.6 3.9 3.8   ALT (SGPT) 15 13 15   AST (SGOT) 35 14 19   BILIRUBIN 0.5 0.3 0.4   ALK PHOS 97 103 102         Lab 06/14/24  0921 06/14/24  0710 06/14/24  0409   PROBNP  --   --  4,208.0*   HSTROP T 117* 121* 117*             Lab 06/14/24  0710 06/14/24  0409   IRON  --  39*   IRON SATURATION (TSAT)  --  15*   TIBC  --  265*   TRANSFERRIN  --  178*   FERRITIN  --  115.10   FOLATE >20.00  --    VITAMIN B 12 727  --          Lab 06/16/24  0329 06/14/24  0433   PH, ARTERIAL 7.388 7.252*   PCO2, ARTERIAL 44.3 55.7*   PO2 ART 86.6 99.9   FIO2 28 36   HCO3 ART 26.7* 24.5   BASE EXCESS ART 1.4  -2.9*   CARBOXYHEMOGLOBIN 1.6 1.5     Brief Urine Lab Results  (Last result in the past 365 days)        Color   Clarity   Blood   Leuk Est   Nitrite   Protein   CREAT   Urine HCG        06/14/24 0918 Yellow   Clear   Negative   Trace   Negative   Negative                 Microbiology Results (last 10 days)       Procedure Component Value - Date/Time    COVID PRE-OP / PRE-PROCEDURE SCREENING ORDER (NO ISOLATION) - Swab, Nasopharynx [[id number]]  (Normal) Collected: 06/14/24 0416    Lab Status: Final result Specimen: Swab from Nasopharynx Updated: 06/14/24 0446    Narrative:      The following orders were created for panel order COVID PRE-OP / PRE-PROCEDURE SCREENING ORDER (NO ISOLATION) - Swab, Nasopharynx.  Procedure                               Abnormality         Status                     ---------                               -----------         ------                     COVID-19 and FLU A/B PCR...[[id number]]  Normal              Final result                 Please view results for these tests on the individual orders.    COVID-19 and FLU A/B PCR, 1 HR TAT - Swab, Nasopharynx [[id number]]  (Normal) Collected: 06/14/24 0416    Lab Status: Final result Specimen: Swab from Nasopharynx Updated: 06/14/24 0446     COVID19 Not Detected     Influenza A PCR Not Detected     Influenza B PCR Not Detected    Narrative:      Fact sheet for providers: https://www.fda.gov/media/326637/download    Fact sheet for patients: https://www.fda.gov/media/719183/download    Test performed by PCR.            EEG    Result Date: 6/18/2024  Reason for referral: 68 y.o.male with altered mental status Technical Summary:  A 19 channel digital EEG was performed using the international 10-20 placement system, including eye leads and EKG leads. Duration: 20 minutes Findings: The patient is resting comfortably in bed and awake.  Diffuse medium amplitude 4-6 Hz intermixed delta and theta activity is seen over both hemispheres.  A clear  posterior rhythm is not seen.  Drowsiness and light sleep are seen with overall slowing of the tracing.  Photic stimulation does not change the background.  Hyperventilation is not performed.  No epileptiform activity is seen. Video: Available Technical quality: Superior EKG: Regular, 70 bpm SUMMARY: Mild-moderate generalized slow No epileptiform activity is a     Diffuse cerebral dysfunction of at least mild degree but nonspecific.  This is most commonly seen due to toxic/metabolic cause This report is transcribed using the Dragon dictation system.      MRI Brain Without Contrast    Result Date: 6/18/2024  MRI BRAIN WO CONTRAST Date of Exam: 6/18/2024 12:07 AM EDT Indication: slurred speech, confusion.  Comparison: Head CT 6/14/2024. Technique:  Routine multiplanar/multisequence sequence images of the brain were obtained without contrast administration. Findings: There is no diffusion restriction to suggest acute infarct. There is a chronic infarct in the inferior right cerebellum. There is moderate generalized parenchymal volume loss. There is patchy FLAIR hyperintense signal within the phuc and extensively throughout the white matter. There is no evidence of acute intracranial hemorrhage. Orbits appear unremarkable. There is a partially empty sella turcica. There is marked thinning of the corpus callosum. The paranasal sinuses and mastoid air cells appear well aerated. Major arterial flow voids appear intact. Calvarial and superficial soft tissue signal is within normal limits. Temporomandibular joints and parotid glands appear symmetric. There is no mass effect, midline shift or abnormal extra-axial collection.     Impression: 1.No acute intracranial abnormality. 2.Moderate generalized parenchymal volume loss and advanced chronic small vessel ischemic change. 3.Chronic infarct in the inferior right cerebellum. Electronically Signed: Kwan Gutierrez MD  6/18/2024 4:19 AM EDT  Workstation ID: [id number]    XR Abdomen  KUB    Result Date: 6/17/2024  XR ABDOMEN KUB Date of Exam: 6/17/2024 11:32 AM EDT Indication: MRI clearance Comparison: None available. Findings: 3 supine views. There is some artifact on the patient. No radiopaque foreign bodies within the patient are identified. The bowel gas pattern is normal. There are vascular calcifications in the pelvis.     Impression: No radiopaque foreign body within the abdomen or pelvis. Electronically Signed: Danya Bonds MD  6/17/2024 12:09 PM EDT  Workstation ID: [id number]    Adult Transthoracic Echo Complete W/ Cont if Necessary Per Protocol    Result Date: 6/15/2024    Left ventricular ejection fraction appears to be 51 - 55%.   Left ventricular wall thickness is consistent with mild concentric hypertrophy.   AV sclerosis     CT Head Without Contrast    Result Date: 6/14/2024  CT HEAD WO CONTRAST Date of Exam: 6/14/2024 12:57 PM EDT Indication: confusion, chf, afib anticoagulated. assess for intracranial bleed. Comparison: None available. Technique: Axial CT images were obtained of the head without contrast administration.  Automated exposure control and iterative construction methods were used. FINDINGS: Gray-white differentiation is maintained and there is no evidence of intracranial hemorrhage, mass or mass effect. Age-related changes of the brain are present including volume loss and typical periventricular sequela of chronic small vessel ischemia. There is also evidence of an old right cerebellar infarct. There is otherwise no evidence of intracranial hemorrhage, mass or mass effect. The ventricles are normal in size and configuration accounting for surrounding volume loss. The orbits are normal and the paranasal sinuses are grossly clear. The calvarium is intact.     Chronic and advanced age-related changes of the brain as above, otherwise without evidence of acute intracranial abnormality. Electronically Signed: Yohannes Hua MD  6/14/2024 1:26 PM EDT  Workstation ID:  [id number]    XR Chest 1 View    Result Date: 6/14/2024  XR CHEST 1 VW Date of Exam: 6/14/2024 4:08 AM EDT Indication: SOA triage protocol Comparison: None available. Findings: The heart is enlarged. There is pulmonary vascular congestion. There are small bilateral pleural effusions. There is bilateral basilar atelectasis.     Impression: Cardiomegaly with pulmonary vascular congestion and small bilateral pleural effusions. Electronically Signed: Ton Busch MD  6/14/2024 4:38 AM EDT  Workstation ID: [id number]             Results for orders placed during the hospital encounter of 06/14/24    Adult Transthoracic Echo Complete W/ Cont if Necessary Per Protocol    Interpretation Summary    Left ventricular ejection fraction appears to be 51 - 55%.    Left ventricular wall thickness is consistent with mild concentric hypertrophy.    AV sclerosis      Plan for Follow-up of Pending Labs/Results:     Discharge Details        Discharge Medications        New Medications        Instructions Start Date   acetaminophen 325 MG tablet  Commonly known as: TYLENOL   650 mg, Oral, Every 4 Hours PRN      aspirin 81 MG chewable tablet   81 mg, Oral, Daily   Start Date: June 20, 2024     bisacodyl 5 MG EC tablet  Commonly known as: DULCOLAX   5 mg, Oral, Daily PRN      bisacodyl 10 MG suppository  Commonly known as: DULCOLAX   10 mg, Rectal, Daily PRN      budesonide 0.5 MG/2ML nebulizer solution  Commonly known as: PULMICORT   0.5 mg, Nebulization, 2 Times Daily - RT      dextrose 40 % gel  Commonly known as: GLUTOSE   15 g, Oral, Every 15 Minutes PRN      donepezil 5 MG tablet  Commonly known as: ARICEPT   5 mg, Oral, Nightly      Insulin Lispro 100 UNIT/ML injection  Commonly known as: humaLOG   3-14 Units, Subcutaneous, 3 Times Daily With Meals      ipratropium-albuterol 0.5-2.5 mg/3 ml nebulizer  Commonly known as: DUO-NEB   3 mL, Nebulization, Every 4 Hours PRN      melatonin 5 MG tablet tablet   5 mg, Oral, Nightly       pantoprazole 40 MG EC tablet  Commonly known as: PROTONIX   40 mg, Oral, Every Early Morning   Start Date: June 20, 2024     polyethylene glycol 17 g packet  Commonly known as: MIRALAX   17 g, Oral, Daily PRN      sennosides-docusate 8.6-50 MG per tablet  Commonly known as: PERICOLACE   2 tablets, Oral, 2 Times Daily             Changes to Medications        Instructions Start Date   gabapentin 100 MG capsule  Commonly known as: NEURONTIN  What changed: when to take this   100 mg, Oral, 3 Times Daily             Continue These Medications        Instructions Start Date   apixaban 5 MG tablet tablet  Commonly known as: ELIQUIS   5 mg, Oral, 2 Times Daily      atorvastatin 40 MG tablet  Commonly known as: LIPITOR   40 mg, Oral, Daily      carvedilol 25 MG tablet  Commonly known as: COREG   25 mg, Oral, 2 Times Daily With Meals      lamoTRIgine 25 MG tablet  Commonly known as: LaMICtal   25 mg, Oral, 3 times daily      PARoxetine 10 MG tablet  Commonly known as: PAXIL   10 mg, Oral, Every Morning             Stop These Medications      amLODIPine 10 MG tablet  Commonly known as: NORVASC     doxazosin 8 MG tablet  Commonly known as: CARDURA     insulin glargine 100 UNIT/ML injection  Commonly known as: LANTUS, SEMGLEE     lisinopril 40 MG tablet  Commonly known as: PRINIVIL,ZESTRIL     NIFEdipine XL 90 MG 24 hr tablet  Commonly known as: PROCARDIA XL              No Known Allergies      Discharge Disposition:  Rehab Facility or Unit (DC - External)    Diet:  Hospital:  Diet Order   Procedures    Diet: Cardiac, Diabetic, Vegetarian; Vegan (No animal products); Healthy Heart (2-3 Na+); Consistent Carbohydrate; Texture: Soft to Chew (NDD 3); Soft to Chew: Chopped Meat; Fluid Consistency: Thin (IDDSI 0)            Activity:  as tolerated    Restrictions or Other Recommendations:  As tolerated       CODE STATUS:    Code Status and Medical Interventions:   Ordered at: 06/14/24 0604     Code Status (Patient has no pulse and  is not breathing):    CPR (Attempt to Resuscitate)     Medical Interventions (Patient has pulse or is breathing):    Full Support       Future Appointments   Date Time Provider Department Center   6/19/2024  1:00 PM MED 13 [facility] None       Additional Instructions for the Follow-ups that You Need to Schedule       Discharge Follow-up with PCP   As directed       Currently Documented PCP:    Vadim Valadez MD    PCP Phone Number:    [phone]     Follow Up Details: 1-2 weeks        Discharge Follow-up with Specified Provider: f/u with saba ABREU; 1 Month   As directed      To: f/u with saba ABREU   Follow Up: 1 Month                      Liliana Gardner MD  06/19/24      Time Spent on Discharge:  I spent  35  minutes on this discharge activity which included: face-to-face encounter with the patient, reviewing the data in the system, coordination of the care with the nursing staff as well as consultants, documentation, and entering orders.

## 2024-06-19 NOTE — THERAPY DISCHARGE NOTE
Acute Care - Speech Language Pathology   Swallow Treatment Note/Discharge  [place]     Patient Name: Lee Saintmartin  : 1955  MRN: [id number]  Today's Date: 2024               Admit Date: 2024    Visit Dx:    ICD-10-CM ICD-9-CM   1. Acute on chronic systolic congestive heart failure  I50.23 428.23     428.0   2. COPD exacerbation  J44.1 491.21   3. Acute respiratory failure with hypoxia and hypercapnia  J96.01 518.81    J96.02    4. Type 2 diabetes mellitus with diabetic neuropathy, unspecified whether long term insulin use  E11.40 250.60     357.2     Patient Active Problem List   Diagnosis    Essential hypertension    Mixed hyperlipidemia    History of CVA (cerebrovascular accident)    Paroxysmal atrial fibrillation    COPD with acute exacerbation    Acute respiratory failure with hypoxia    DOMITILA (acute kidney injury)    Anemia     Past Medical History:   Diagnosis Date    Anemia     Atrial fibrillation     Dementia     Diabetes mellitus     GERD (gastroesophageal reflux disease)     Hyperlipidemia     Hypertension     Major depressive disorder     Pulmonary fibrosis     PVD (peripheral vascular disease)     Renal disorder     Stroke      Past Surgical History:   Procedure Laterality Date    ABOVE KNEE AMPUTATION Bilateral        SLP Recommendation and Plan     SLP Diet Recommendation: soft to chew textures, chopped, thin liquids, other (see comments) (ok for soft/whole if pt prefers) (24 1145)              Anticipated Discharge Disposition (SLP): No further SLP services warranted (24 1145)              Daily Summary of Progress (SLP): progress toward functional goals as expected, prepare for discharge (24 1145)     Anticipated Discharge Disposition (SLP): No further SLP services warranted (24 1145)                 Treatment Assessment (SLP): improved, no clinical signs of, aspiration, pharyngeal dysphagia (24 1145)  Treatment Assessment Comments (SLP): Ok for  upgrade to soft/whole textures; however, pt prefers soft/chopped due to difficulty cutting his food himself. No further swallowing concerns at this time. (06/19/24 1145)       Plan of Care Reviewed With: patient (06/19/24 1314)  Progress: improving (06/19/24 1314)    SWALLOW EVALUATION (Last 72 Hours)       SLP Adult Swallow Evaluation       Row Name 06/19/24 1145 06/18/24 1045                Rehab Evaluation    Document Type discharge treatment  -AC evaluation  -CJ       Subjective Information no complaints  -AC no complaints  -CJ       Patient Observations alert;cooperative  -AC alert;cooperative  -CJ       Patient/Family/Caregiver Comments/Observations No family present.  -AC no family present  -CJ       Patient Effort good  -AC good  -CJ       Symptoms Noted During/After Treatment -- none  -CJ          General Information    Patient Profile Reviewed -- yes  -CJ       Pertinent History Of Current Problem -- Pt adm w/ confusion on 6/14; sig h/o HTN, mixed HLD, CVA, COPD, resp failure, DOMITILA, anemia; possible aspiration event  -CJ       Current Method of Nutrition -- NPO  -CJ       Precautions/Limitations, Vision -- WFL;for purposes of eval  -CJ       Precautions/Limitations, Hearing -- WFL;for purposes of eval  -CJ       Prior Level of Function-Communication -- other (see comments)  dementia per chart  -CJ       Prior Level of Function-Swallowing -- unknown  -CJ       Plans/Goals Discussed with -- patient  -CJ       Barriers to Rehab -- cognitive status  -CJ       Patient's Goals for Discharge -- return to PO diet  -CJ          Pain    Additional Documentation -- Pain Scale: FACES Pre/Post-Treatment (Group)  -CJ          Pain Scale: FACES Pre/Post-Treatment    Pain: FACES Scale, Pretreatment 0-->no hurt  -AC 0-->no hurt  -CJ       Posttreatment Pain Rating 0-->no hurt  -AC 0-->no hurt  -CJ          Oral Motor Structure and Function    Dentition Assessment -- missing teeth  -CJ       Secretion Management -- WNL/WFL   -       Mucosal Quality -- moist, healthy  -          Oral Musculature and Cranial Nerve Assessment    Oral Motor General Assessment -- generalized oral motor weakness  -          General Eating/Swallowing Observations    Respiratory Support Currently in Use -- nasal cannula  -       O2 Liters -- 3L  -       Eating/Swallowing Skills -- fed by SLP  -       Positioning During Eating -- upright in bed  -       Utensils Used -- spoon;cup;straw  -       Consistencies Trialed -- regular textures;pureed;ice chips;thin liquids;nectar/syrup-thick liquids  -          Clinical Swallow Eval    Oral Prep Phase -- impaired  -       Pharyngeal Phase -- --  r/o pharyngeal impairment  -       Clinical Swallow Evaluation Summary -- Possible aspiration event last pm. Pt seen w/ multiple presentations of thins via spoon/cup and straw, no obvious s/s of aspiration. Pt reports no difficulty. Pt w/ limited acceptance of further solids 2/2 fatigue. Will modify po diet to mechanical grnd per pt request w/ thins and f/u for diet tolerance  -          Oral Prep Concerns    Oral Prep Concerns -- prolonged mastication  -       Prolonged Mastication -- regular consistencies  -          SLP Evaluation Clinical Impression    SLP Swallowing Diagnosis -- oral dysphagia;R/O pharyngeal dysphagia  -       Functional Impact -- risk of aspiration/pneumonia  -       Rehab Potential/Prognosis, Swallowing -- adequate, monitor progress closely  -       Swallow Criteria for Skilled Therapeutic Interventions Met -- demonstrates skilled criteria  -          SLP Treatment Clinical Impressions    Treatment Assessment (SLP) improved;no clinical signs of;aspiration;pharyngeal dysphagia  -AC --       Treatment Assessment Comments (SLP) Ok for upgrade to soft/whole textures; however, pt prefers soft/chopped due to difficulty cutting his food himself. No further swallowing concerns at this time.  -AC --       Daily Summary of  Progress (SLP) progress toward functional goals as expected;prepare for discharge  - --       Barriers to Overall Progress (SLP) Cognitive status  - --       Care Plan Review evaluation/treatment results reviewed;care plan/treatment goals reviewed;risks/benefits reviewed;current/potential barriers reviewed;patient/other agree to care plan  - --          Recommendations    Therapy Frequency (Swallow) -- PRN  -       Predicted Duration Therapy Intervention (Days) -- 1 week  -       SLP Diet Recommendation soft to chew textures;chopped;thin liquids;other (see comments)  ok for soft/whole if pt prefers  - mechanical ground textures;thin liquids  -       Recommended Diagnostics -- other (see comments)  diet tolerance vs further instrumental  -       Recommended Precautions and Strategies upright posture during/after eating;general aspiration precautions  assist PRN  - upright posture during/after eating;small bites of food and sips of liquid;general aspiration precautions  -       Oral Care Recommendations Oral Care BID/PRN;Toothbrush  - Oral Care BID/PRN;Toothbrush  -       SLP Rec. for Method of Medication Administration meds whole;meds crushed;with puree;as tolerated  - meds whole;with puree;meds crushed;as tolerated  -       Monitor for Signs of Aspiration -- yes;notify SLP if any concerns  -       Anticipated Discharge Disposition (SLP) No further SLP services warranted  - skilled nursing facility  -                 User Key  (r) = Recorded By, (t) = Taken By, (c) = Cosigned By      Initials Name Effective Dates     Ju Bañuelos, MS CCC-SLP 02/03/23 -      Karla Mahoney, MS CCC-SLP 06/03/24 -                     EDUCATION  The patient has been educated in the following areas:   Dysphagia (Swallowing Impairment) Modified Diet Instruction.         SLP GOALS       Row Name 06/19/24 1145 06/18/24 1045          (LTG) Patient will demonstrate functional swallow for    Diet  Texture (Demonstrate functional swallow) soft to chew (whole) textures  -AC soft to chew (whole) textures  -CJ     Liquid viscosity (Demonstrate functional swallow) thin liquids  -AC thin liquids  -CJ     [place] (Demonstrate functional swallow) with minimal cues (75-90% accuracy)  -AC with minimal cues (75-90% accuracy)  -CJ     Time Frame (Demonstrate functional swallow) by discharge  -AC by discharge  -CJ     Progress/Outcomes (Demonstrate functional swallow) goal met  -AC new goal  -CJ        (STG) Patient will tolerate trials of    Consistencies Trialed (Tolerate trials) mechanical ground textures;thin liquids  -AC mechanical ground textures;thin liquids  -CJ     Desired Outcome (Tolerate trials) without signs/symptoms of aspiration;without signs of distress;with adequate oral prep/transit/clearance  -AC without signs/symptoms of aspiration;without signs of distress;with adequate oral prep/transit/clearance  -CJ     [place] (Tolerate trials) with minimal cues (75-90% accuracy)  -AC with minimal cues (75-90% accuracy)  -CJ     Time Frame (Tolerate trials) 1 week  -AC 1 week  -CJ     Progress/Outcomes (Tolerate trials) goal met  -AC new goal  -CJ     Comment (Tolerate trials) Trialed mech ground textures and thin liquid via cup/straw. No overt clinical s/sxs aspiration. No oropharyngeal concerns.  -AC --        (STG) Patient will tolerate therapeutic trials of    Consistencies Trialed (Tolerate therapeutic trials) soft to chew (whole) textures;soft to chew (chopped) textures  -AC soft to chew (whole) textures;soft to chew (chopped) textures  -CJ     Desired Outcome (Tolerate therapeutic trials) without signs/symptoms of aspiration;with adequate oral prep/transit/clearance  -AC without signs/symptoms of aspiration;with adequate oral prep/transit/clearance  -CJ     [place] (Tolerate therapeutic trials) with minimal cues (75-90% accuracy)  -AC with minimal cues (75-90% accuracy)  -CJ     Time Frame  (Tolerate therapeutic trials) 1 week  -AC 1 week  -CJ     Progress/Outcomes (Tolerate therapeutic trials) goal met  -AC new goal  -CJ     Comment (Tolerate therapeutic trials) Trialed soft/whole textures. Slightly increased oral prep, but mastication adequate and no oral residue noted. No overt clinical s/sxs aspiration.  -AC --               User Key  (r) = Recorded By, (t) = Taken By, (c) = Cosigned By      Initials Name Provider Type    Ju Leonard MS CCC-SLP Speech and Language Pathologist    Karla Bell MS CCC-SLP Speech and Language Pathologist                           Time Calculation:    Time Calculation- SLP       Row Name 06/19/24 1314             Time Calculation- SLP    SLP Start Time 1145  -AC      SLP Received On 06/19/24  -AC         Untimed Charges    31305-RL Treatment Swallow Minutes 35  -AC         Total Minutes    Untimed Charges Total Minutes 35  -AC       Total Minutes 35  -AC                User Key  (r) = Recorded By, (t) = Taken By, (c) = Cosigned By      Initials Name Provider Type    Ju Leonard MS CCC-SLP Speech and Language Pathologist                    Therapy Charges for Today       Code Description Service Date Service Provider Modifiers Qty    03255235096 HC ST TREATMENT SWALLOW 2 6/19/2024 Ju Bañuelos MS CCC-SLP GN 1                 SLP Discharge Summary  Anticipated Discharge Disposition (SLP): No further SLP services warranted    Ju Bañuelos MS CCC-SLP  6/19/2024

## 2024-06-19 NOTE — TELEPHONE ENCOUNTER
I CALLED [facility] WITH MESSAGE AND PT IS NO LONGER WITH THE FACILITY, SO I CALLED PT'S POA AND SHE SAID PT NOW LIVES AT [facility] # [phone].  I CALLED JOSE DANIEL AND RONALD WITH SOMEONE TO GIVE A CALL BACK TO OFFER A SOONER APPT FOR CONSULTS ON 6/27/2024 AT 3 PM OR 8/29/2024 OR AT [facility] FOR 6/26/2024 AT 8 AM OR 9/2024.  CANNOT GUARANTEE ANY OF THESE TO BE AVAILABLE WHEN THEY RETURN THE CALL BUT PLEASE ALLOW THEM TO SPEAK WITH JUVENTINO OR FAITH [phone]

## 2024-06-19 NOTE — PROGRESS NOTES
[facility] Neurology    Progress Note    Patient Name: Lee Saintmartin  : 1955  MRN: [id number]  Primary Care Physician:  Vadim Valadez MD  Date of admission: 2024    Subjective     Chief Complaint: Altered mental status    History of Present Illness   Patient seen resting comfortably in bed.  He was eating breakfast at time of assessment.No acute events overnight.  Oriented x 3.    Review of Systems   General: Negative for fever, nausea, or vomiting.   Neurological: Negative for headache, pain, or weakness.     Objective     Physical Exam  Vitals and nursing note reviewed.   Constitutional:       General: He is not in acute distress.     Appearance: He is not ill-appearing.   Eyes:      Extraocular Movements: Extraocular movements intact.      Pupils: Pupils are equal, round, and reactive to light.      Comments: No nystagmus or deviated gaze noted   Neurological:      Mental Status: He is alert. Mental status is at baseline.      Cranial Nerves: Cranial nerves 2-12 are intact.      Sensory: Sensation is intact.      Motor: Motor function is intact.      Coordination: Coordination is intact.      Comments:   Patient able to state his name, birthdate, current year and location.    Cranial Nerves   CN II: Pupils are equal, round, and reactive to light. Normal visual acuity and visual fields.    CN III IV VI: Extraocular movements are full without nystagmus.  CN V: Normal facial sensation and strength of muscles of mastication.  CN VII: Facial movements are symmetric. No weakness.  CN VIII:  Auditory acuity is normal.  CN IX & X:  Symmetric palatal movement.  CN XI: Sternocleidomastoid and trapezius are normal.  No weakness.  CN XII: The tongue is midline.  No atrophy or fasciculations.            Vitals:   Temp:  [98.4 °F (36.9 °C)-99.1 °F (37.3 °C)] 98.4 °F (36.9 °C)  Heart Rate:  [68-96] 77  Resp:  [16-19] 16  BP: (132-167)/(77-89) 161/80  Flow (L/min):  [2-5] 2    Current  Medications    Current Facility-Administered Medications:     acetaminophen (TYLENOL) tablet 650 mg, 650 mg, Oral, Q4H PRN, Shaggy Liu PA-C    apixaban (ELIQUIS) tablet 5 mg, 5 mg, Oral, Q12H, Jake Rolle MD, 5 mg at 06/18/24 2148    aspirin chewable tablet 81 mg, 81 mg, Oral, Daily, Jake Rolle MD, 81 mg at 06/18/24 1348    atorvastatin (LIPITOR) tablet 40 mg, 40 mg, Oral, Nightly, Jake Rolle MD, 40 mg at 06/18/24 2146    sennosides-docusate (PERICOLACE) 8.6-50 MG per tablet 2 tablet, 2 tablet, Oral, BID, 2 tablet at 06/18/24 2147 **AND** polyethylene glycol (MIRALAX) packet 17 g, 17 g, Oral, Daily PRN, 17 g at 06/15/24 0032 **AND** bisacodyl (DULCOLAX) EC tablet 5 mg, 5 mg, Oral, Daily PRN **AND** bisacodyl (DULCOLAX) suppository 10 mg, 10 mg, Rectal, Daily PRN, Dina Marquez APRN    budesonide (PULMICORT) nebulizer solution 0.5 mg, 0.5 mg, Nebulization, BID - RT, Víctor José MD, 0.5 mg at 06/19/24 0631    carvedilol (COREG) tablet 25 mg, 25 mg, Oral, BID With Meals, Jake Rolle MD, 25 mg at 06/18/24 2148    dextrose (D50W) (25 g/50 mL) IV injection 25 g, 25 g, Intravenous, Q15 Min PRN, Shaggy Liu PA-C    dextrose (GLUTOSE) oral gel 15 g, 15 g, Oral, Q15 Min PRN, Shaggy Liu PA-C    donepezil (ARICEPT) tablet 5 mg, 5 mg, Oral, Nightly, Omid Aparicio MD, 5 mg at 06/18/24 2147    gabapentin (NEURONTIN) capsule 100 mg, 100 mg, Oral, TID, Omid Aparicio MD, 100 mg at 06/18/24 2147    glucagon (GLUCAGEN) injection 1 mg, 1 mg, Intramuscular, Q15 Min PRN, Shaggy Liu PA-C    Insulin Lispro (humaLOG) injection 3-14 Units, 3-14 Units, Subcutaneous, TID With Meals, Shaggy Liu PA-C, 5 Units at 06/14/24 2232    iopamidol (ISOVUE-300) 61 % injection 85 mL, 85 mL, Intravenous, Once in imaging, [place], Jake PURI MD    ipratropium-albuterol (DUO-NEB) nebulizer solution 3 mL, 3 mL, Nebulization, Q4H PRN,  Jake Rolle MD    ipratropium-albuterol (DUO-NEB) nebulizer solution 3 mL, 3 mL, Nebulization, 4x Daily - RT, Víctor José MD, 3 mL at 06/19/24 0631    lamoTRIgine (LaMICtal) tablet 25 mg, 25 mg, Oral, Q8H, Omid Aparicio MD, 25 mg at 06/19/24 0623    Magnesium Low Dose Replacement - Follow Nurse / BPA Driven Protocol, , Does not apply, PRN, Jake Rolle MD    melatonin tablet 5 mg, 5 mg, Oral, Nightly, Yeimi Salguero, APRN, 5 mg at 06/18/24 2146    nitroglycerin (NITROSTAT) SL tablet 0.4 mg, 0.4 mg, Sublingual, Q5 Min PRN, Shaggy Liu PA-C    ondansetron (ZOFRAN) injection 4 mg, 4 mg, Intravenous, Q6H PRN, Chery Martinez PA-C, 4 mg at 06/17/24 2224    pantoprazole (PROTONIX) EC tablet 40 mg, 40 mg, Oral, Q AM, Jake Rolle MD, 40 mg at 06/19/24 0623    sodium chloride 0.9 % flush 10 mL, 10 mL, Intravenous, PRN, Lincoln Silveira MD    sodium chloride 0.9 % flush 10 mL, 10 mL, Intravenous, Q12H, Shaggy Liu PA-C, 10 mL at 06/18/24 2149    sodium chloride 0.9 % infusion 40 mL, 40 mL, Intravenous, PRN, Shaggy Liu PA-C    thiamine (B-1) injection 200 mg, 200 mg, Intravenous, Daily, 200 mg at 06/18/24 1346 **FOLLOWED BY** [START ON 6/22/2024] thiamine (VITAMIN B-1) tablet 100 mg, 100 mg, Oral, Daily, Salguero, April K, APRN    ziprasidone (GEODON) injection 10 mg, 10 mg, Intramuscular, Q4H PRN, Jose M April GEO, APRN    Laboratory Results:   Lab Results   Component Value Date    GLUCOSE 106 (H) 06/18/2024    CALCIUM 9.1 06/18/2024     06/18/2024    K 4.6 06/18/2024    CO2 25.0 06/18/2024     06/18/2024    BUN 29 (H) 06/18/2024    CREATININE 2.20 (H) 06/18/2024    EGFRIFAFRI 46 (L) 02/23/2021    EGFRIFNONA 40 (L) 02/23/2021    BCR 13.2 06/18/2024    ANIONGAP 9.0 06/18/2024     Lab Results   Component Value Date    WBC 9.55 06/18/2024    HGB 10.7 (L) 06/18/2024    HCT 35.1 (L) 06/18/2024    MCV 84.2 06/18/2024     06/18/2024     No  "results found for: \"CHOL\"  Lab Results   Component Value Date    HDL 27 (L) 01/28/2021    HDL 23 (L) 10/17/2020    HDL 10 (L) 10/01/2020     Lab Results   Component Value Date     (H) 01/28/2021    LDL 25 10/17/2020    LDL 27 10/01/2020     Lab Results   Component Value Date    TRIG 150 (H) 01/28/2021    TRIG 86 10/17/2020    TRIG 98 10/01/2020     Lab Results   Component Value Date    HGBA1C 5.30 06/14/2024     Lab Results   Component Value Date    INR 1.10 03/11/2023    INR 1.78 (H) 12/07/2020    INR 3.18 (H) 11/28/2020    PROTIME 11.9 03/11/2023    PROTIME 20.4 (H) 12/07/2020    PROTIME 32.4 (H) 11/28/2020     Lab Results   Component Value Date    FOLATE >20.00 06/14/2024     Lab Results   Component Value Date    [id number] 727 06/14/2024       MRI Brain Without Contrast    Result Date: 6/18/2024  MRI BRAIN WO CONTRAST Date of Exam: 6/18/2024 12:07 AM EDT Indication: slurred speech, confusion.  Comparison: Head CT 6/14/2024. Technique:  Routine multiplanar/multisequence sequence images of the brain were obtained without contrast administration. Findings: There is no diffusion restriction to suggest acute infarct. There is a chronic infarct in the inferior right cerebellum. There is moderate generalized parenchymal volume loss. There is patchy FLAIR hyperintense signal within the phuc and extensively throughout the white matter. There is no evidence of acute intracranial hemorrhage. Orbits appear unremarkable. There is a partially empty sella turcica. There is marked thinning of the corpus callosum. The paranasal sinuses and mastoid air cells appear well aerated. Major arterial flow voids appear intact. Calvarial and superficial soft tissue signal is within normal limits. Temporomandibular joints and parotid glands appear symmetric. There is no mass effect, midline shift or abnormal extra-axial collection.     Impression: Impression: 1.No acute intracranial abnormality. 2.Moderate generalized parenchymal " volume loss and advanced chronic small vessel ischemic change. 3.Chronic infarct in the inferior right cerebellum. Electronically Signed: Kwan Gutierrez MD  6/18/2024 4:19 AM EDT  Workstation ID: [id number]        Assessment / Plan   Brief Patient Summary:  Lee Saintmartin is a 68 y.o. male past medical history of A-fib on Eliquis, chronic HFpEF, HTN, HLD, T2DM, CKD stage III, previous CVA, vascular dementia, bipolar disorder, PVD, bilateral AKA, morbid obesity who presented to [facility] ED with complaint of shortness of breath and hypoxia.  Patient continues with altered mentation, sleep disturbances and questionable memory issues.     Plan:   Previous CVA  Questionable vascular dementia  Delirium  Bipolar disorder  Altered mental status  Sleep deprivation  CT head with chronic microvascular changes noted.  No signs of acute abnormality.  Old right cerebral infarct noted.  MRI brain with no acute abnormalities.  Moderate volume loss and advance chronic microvascular small ischemic changes noted.  Chronic inferior right cerebellar stroke noted.  EEG nonspecific mild diffuse cerebral dysfunction noted.  Commonly seen with toxic/metabolic etiologies  Continue home Lamictal 25 mg 3 times daily for bipolar disorder  Vitamin B12, TSH and folate within normal limits  Continue high-dose thiamine 200 mg every 8 hours for 5 days followed by 100 mg daily  Continue home gabapentin at reduced dose of 100 mg 3 times daily  Continue Melatonin 5 mg nightly  Continue  Aricept 5 mg nightly, no signs of intolerance.  Patient to work with PT/OT  IM Geodon for extreme agitation  Follow-up with BRADY Sanabria  Patient continues at neurologic baseline.  Will see patient on request.      I have discussed the above with the patient, bedside RN Dr. Gardner  Time spent with patient: 35 minutes in face-to-face evaluation and management of the patient.      BRADY Jacome

## 2024-06-19 NOTE — TELEPHONE ENCOUNTER
Caller: GODFREY    Relationship to patient: SOFIA SINGH    Best call back number: [phone]    Chief complaint: MEMORY AND CONFUSION    Type of visit: HOSPITAL FOLLOW UP    Requested date: LATE JULY EARLY AUG    If rescheduling, when is the original appointment: 10/14/24     Additional notes:RUY NEEDS A ONE MONTH HOSPITAL FOLLOW UP. FIRST AVAIL IN OCT. ALSO WOULD NOT LET ME SCHEDULE AT [facility]    PLEASE ADVISE  THANK YOU

## 2024-06-19 NOTE — PROGRESS NOTES
[facility] Cardiology  Inpatient Progress Note      Chief Complaint/Reason for consult:    Chief Complaint   Patient presents with    Shortness of Breath            Subjective  No complaints today, denies CP / dyspnea         Vital Sign Min/Max for last 24 hours  Temp  Min: 98.4 °F (36.9 °C)  Max: 99.1 °F (37.3 °C)   BP  Min: 132/86  Max: 167/87   Pulse  Min: 68  Max: 96   Resp  Min: 16  Max: 19   SpO2  Min: 68 %  Max: 100 %   Flow (L/min)  Min: 2  Max: 5      Intake/Output Summary (Last 24 hours) at 2024 0848  Last data filed at 2024 0431  Gross per 24 hour   Intake 440 ml   Output 1350 ml   Net -910 ml           Gen: well developed, sitting up in bed, comfortable appearing  HEENT: MMM, sclerae anicteric, conjunctivae normal  CV: regular rate, regular rhythm, no murmurs or rubs, normal S1, S2. 2+ radial and DP pulses  Pulm: NC oxygen but dislodged, normal work of breathing, no wheezes, rales, rhonchi  Abd: soft, nontender, nondistended  Ext: bilateral AKA, no dependent edema  Neuro: alert, oriented to self alone, face symmetrical    Tele:  SR    Results Review (reviewed the patient's recent labs in the electronic medical record):     EK/14 - SR, LAE, non-specific ST / TW changes    Results for orders placed during the hospital encounter of 24    Adult Transthoracic Echo Complete W/ Cont if Necessary Per Protocol    Interpretation Summary    Left ventricular ejection fraction appears to be 51 - 55%.    Left ventricular wall thickness is consistent with mild concentric hypertrophy.    AV sclerosis       Radiology: EEG    Result Date: 2024  Diffuse cerebral dysfunction of at least mild degree but nonspecific.  This is most commonly seen due to toxic/metabolic cause This report is transcribed using the Dragon dictation system.      MRI Brain Without Contrast    Result Date: 2024  Impression: 1.No acute intracranial abnormality. 2.Moderate generalized parenchymal volume  loss and advanced chronic small vessel ischemic change. 3.Chronic infarct in the inferior right cerebellum. Electronically Signed: Kwan Gutierrez MD  6/18/2024 4:19 AM EDT  Workstation ID: [id number]    XR Abdomen KUB    Result Date: 6/17/2024  Impression: No radiopaque foreign body within the abdomen or pelvis. Electronically Signed: Danya Bonds MD  6/17/2024 12:09 PM EDT  Workstation ID: [id number]     Lab Results   Component Value Date    WBC 9.55 06/18/2024    HGB 10.7 (L) 06/18/2024    HCT 35.1 (L) 06/18/2024    MCV 84.2 06/18/2024     06/18/2024     Lab Results   Component Value Date    GLUCOSE 106 (H) 06/18/2024    CALCIUM 9.1 06/18/2024     06/18/2024    K 4.6 06/18/2024    CO2 25.0 06/18/2024     06/18/2024    BUN 29 (H) 06/18/2024    CREATININE 2.20 (H) 06/18/2024    EGFRIFAFRI 46 (L) 02/23/2021    EGFRIFNONA 40 (L) 02/23/2021    BCR 13.2 06/18/2024    ANIONGAP 9.0 06/18/2024     Lab Results   Component Value Date    TROPONINT 117 (C) 06/14/2024    TROPONINT 121 (C) 06/14/2024    TROPONINT 117 (C) 06/14/2024      Lab Results   Component Value Date    PROBNP 4,208.0 (H) 06/14/2024     Lab Results   Component Value Date    HGBA1C 5.30 06/14/2024      Lab Results   Component Value Date    CHLPL 190 01/28/2021    TRIG 150 (H) 01/28/2021    HDL 27 (L) 01/28/2021     (H) 01/28/2021      Assessment     Lee Saintmartin is a 68 y.o. with a history of PAD with bilateral AKA, CVA, pulmonary fibrosis on 3L home oxygen, HTN, DM, dementia who was admitted via the ED overnight with reported dyspnea. Cardiology was consulted for multiple things including possible CHF, elevated troponin, and history of AF (pt currently in SR)     Elevated troponin   - no chest pain, no acute ischemic changes on ECG   - suspect non-ischemic myocardial injury in the setting of renal failure and acute on chronic hypoxemic respiratory failure. No chest pain concerning for ACS at this time     Acute on chronic HFpEF    - respiratory status doing well now, diuretics PRN   - TTE with low normal EF, pseudonormal filling with high LAP     Hx PAF   - currently SR   - AC held     Hx of PAD with bilateral AKA   - continue statin, AC, aspirin    Okay to d/c from a cardiology standpoint. Can follow-up as an outpatient in 2-3 months     MIREYA Courtney MD  6/19/2024  08:48 EDT

## 2024-06-19 NOTE — PLAN OF CARE
Goal Outcome Evaluation:  Plan of Care Reviewed With: patient        Progress: improving         Anticipated Discharge Disposition (SLP): No further SLP services warranted             Treatment Assessment (SLP): improved, no clinical signs of, aspiration, pharyngeal dysphagia (06/19/24 1145)  Treatment Assessment Comments (SLP): Ok for upgrade to soft/whole textures; however, pt prefers soft/chopped due to difficulty cutting his food himself. No further swallowing concerns at this time. (06/19/24 1143)

## 2024-06-19 NOTE — CASE MANAGEMENT/SOCIAL WORK
Case Management Discharge Note      Final Note: To [facility], can return back under skilled services. Nursing to call report to [phone], fax [phone].   I have updated his POA, akbar Ms. Garber of plans and have requested ambulance for later today      Ambulance scheduled for 1 pm     Selected Continued Care - Admitted Since 6/14/2024       Destination Coordination complete.      Service Provider Selected Services Address Phone Fax Patient Preferred    [facility] [address] [place] [address] [place] [address] [phone] --              Durable Medical Equipment    No services have been selected for the patient.                Dialysis/Infusion    No services have been selected for the patient.                Home Medical Care    No services have been selected for the patient.                Therapy    No services have been selected for the patient.                Community Resources    No services have been selected for the patient.                Community & DME    No services have been selected for the patient.                         Final Discharge Disposition Code: 03 - skilled nursing facility (SNF)

## 2024-06-19 NOTE — DISCHARGE PLACEMENT REQUEST
"Saintmartin, Lee (68 y.o. Male)       Date of Birth   1955    Social Security Number       Address   [address] [address]    Home Phone   [phone]    MRN   [id number]       Episcopal   [employer]    Marital Status                               Admission Date   6/14/24    Admission Type   Emergency    Admitting Provider   Liliana Gardner MD    Attending Provider   Liliana Gardner MD    Department, Room/Bed   [address], [address]/1       Discharge Date       Discharge Disposition   Rehab Facility or Unit (DC - External)    Discharge Destination                                 Attending Provider: Liliana Gardner MD    Allergies: No Known Allergies    Isolation: None   Infection: None   Code Status: CPR    Ht: 180.3 cm (70.98\")   Wt: 98.2 kg (216 lb 9.6 oz)    Admission Cmt: None   Principal Problem: None                  Active Insurance as of 6/14/2024       Primary Coverage       Payor Plan Insurance Group Employer/Plan Group    MEDICARE MEDICARE A & B        Payor Plan Address Payor Plan Phone Number Payor Plan Fax Number Effective Dates    [address] [phone]  11/1/2015 - None Entered    [facility] [id number]         Subscriber Name Subscriber Birth Date Member ID       SAINTMARTIN,LEE 1955 [id number]               Secondary Coverage       Payor Plan Insurance Group Employer/Plan Group    [place]        Payor Plan Address Payor Plan Phone Number Payor Plan Fax Number Effective Dates    [address] [phone]  6/14/2024 - None Entered    [place] [address]         Subscriber Name Subscriber Birth Date Member ID       SAINTMARTIN,LEE 1955 [id number]                     Emergency Contacts        (Rel.) Home Phone Work Phone Mobile Phone    jose bynum (Relative) [phone] -- [phone]    [place] (Relative) [phone] -- [phone]              Insurance Information  "                 MEDICARE/MEDICARE A & B Phone: [phone]    Subscriber: Saintmartin, Lee Subscriber#: [id number]    Group#: -- Precert#: --        [place] Phone: [phone]    Subscriber: Saintmartin, Lee Subscriber#: [id number]    Group#: -- Precert#: [id number]               Discharge Summary        Liliana Gardner MD at 24 1205              [facility]  DISCHARGE SUMMARY    Patient Name: Lee Saintmartin  : 1955  MRN: [id number]    Date of Admission: 2024  4:02 AM  Date of Discharge:  24  Primary Care Physician: Vadim Valadez MD    Consults       Date and Time Order Name Status Description    2024  8:59 AM Inpatient Neurology Consult General Completed     2024  6:59 AM Inpatient Cardiology Consult Completed             Hospital Course     Presenting Problem: confusion    Active Hospital Problems    Diagnosis  POA    Essential hypertension [I10]  Yes    Mixed hyperlipidemia [E78.2]  Yes    History of CVA (cerebrovascular accident) [Z86.73]  Not Applicable    Paroxysmal atrial fibrillation [I48.0]  Yes    COPD with acute exacerbation [J44.1]  Yes    Acute respiratory failure with hypoxia [J96.01]  Unknown    DOMITILA (acute kidney injury) [N17.9]  Yes    Anemia [D64.9]  Yes      Resolved Hospital Problems   No resolved problems to display.          Hospital Course:  Lee Saintmartin is a 68 y.o. male with history of PVD s/p bilateral AKAs, CKD 3-4, atrial fibrillation on eliquis, CVA, vascular dementia, concern for Bipolar Disorder, possible COPD, chronic anemia, DMII, stroke with residual left hemiplegia, dysphagia hypercarbic/hypoxic respiratory failure presents with shortness of breath and hypoxia.  In ED CXR consistent with vascular congestion, proBNP and troponin elevated. Patient given lasix, solumedrol and started on BiPAP.     Acute Respiratory Failure with hypoxia   Acute on Chronic HFpEF  -received  IV lasix in ED on admission, further diuresis as needed  -scheduled duonebs and budesonide  - COVID and influenza pcr negative.    - BiPAP HS and PRN  - acidosis resolved by ABG   -- weaned to RA at time of discharge, appearing euvolemic      PAF  - eliquis     H/o CVA  - asa     Delirium  Dementia?  - patient confusion ongoing throughout stay, intermittent combatitiveness  - per my partner-->cousin at bedside stated patient is not his normal self.  Cousin describes fall from bed approx one week ago with head strike.  CT head at [facility] ED unremarkable, and CT head imaging performed here at admission without acute findings.  My partner did speak with the patient's niece, and she says she noted some slurred speech on Monday (prior to admission).    - MRI brain done with no acute abnl, moderate volume loss and adv small vessel dz, chronic strokee in inf right cerebellum  - TSH normal   - IV thiamine--> PO thiamine at discharge   - Neurology consultation- have d/w them. They do not recommend any further testing and would be ok with home discharge as favor this is component of hospital delirium complicating vascular dementia      Bipolar disorder  - on lamictal      DMII  -resume home gabapentin but at slightly reduced dosing      CKD 3-4  - baseline creatinine 2.3 to 2.6  - creatinine 1.8     Hyperkalemia  - improved     PVD  H/o bilateral AKAs     Anemia  - iron sat 39%, normal b12 and folate         Discharge Follow Up Recommendations for outpatient labs/diagnostics:  PCP 1-2 weeks  Neuro Tiny Key APRN 1 month     Day of Discharge     HPI:   Doing fine. Nursing at bedside.     Review of Systems  Gen- No fevers, chills  CV- No chest pain, palpitations  Resp- No cough, dyspnea  GI- No N/V/D, abd pain      Vital Signs:   Temp:  [98.4 °F (36.9 °C)-99.1 °F (37.3 °C)] 98.4 °F (36.9 °C)  Heart Rate:  [68-96] 77  Resp:  [16-18] 16  BP: (132-167)/(80-89) 161/80  Flow (L/min):  [2-3] 2      Physical Exam:  GEN: NAD, resting  in bed, awake  HEENT: on room air, atraumatic, normocephalic  NECK: supple, no masses  RESP: on room air, normal effort  CV: on tele, sinus rhythm  PSYCH: normal affect, appropriate  NEURO: resting in bed, no gross def appreciated   MSK: no edema noted, b/l AKA   SKIN: no rashes noted          Pertinent  and/or Most Recent Results     LAB RESULTS:      Lab 06/18/24  0854 06/17/24  0826 06/16/24  1413 06/16/24  1412 06/15/24  0523 06/14/24  0710 06/14/24  0409   WBC 9.55 10.10 9.53  --  14.73* 10.55 10.60   HEMOGLOBIN 10.7* 10.5* 10.3*  --  8.9* 8.9* 8.9*   HEMATOCRIT 35.1* 33.1* 33.2*  --  28.7* 29.9* 30.5*   PLATELETS 145 150 144  --  132* 140 137*   NEUTROS ABS  --  8.10* 7.88*  --   --  9.96* 9.43*   IMMATURE GRANS (ABS)  --  0.02 0.06*  --   --  0.06*  --    LYMPHS ABS  --  0.77 0.55*  --   --  0.30*  --    MONOS ABS  --  0.82 0.73  --   --  0.12  --    EOS ABS  --  0.34 0.25  --   --  0.08 0.42*   MCV 84.2 81.5 83.0  --  82.2 84.5 84.7   PROCALCITONIN  --   --   --  0.12  --   --   --          Lab 06/18/24  0854 06/17/24  0826 06/16/24  1412 06/15/24  0523 06/14/24  0710 06/14/24  0409   SODIUM 139 135* 137 141 141 139   POTASSIUM 4.6 4.5 4.8 5.4* 5.1 5.1   CHLORIDE 105 99 102 108* 108* 106   CO2 25.0 25.0 25.0 25.0 23.0 25.0   ANION GAP 9.0 11.0 10.0 8.0 10.0 8.0   BUN 29* 28* 32* 31* 26* 24*   CREATININE 2.20* 1.88* 1.98* 2.32* 2.29* 2.24*   EGFR 31.4* 38.0* 35.7* 29.5* 30.0* 30.8*   GLUCOSE 106* 98 120* 129* 142* 129*   CALCIUM 9.1 9.0 8.9 8.7 8.8 8.6   MAGNESIUM  --   --   --  1.6  --   --    HEMOGLOBIN A1C  --   --   --   --   --  5.30   TSH  --  2.640  --   --   --   --          Lab 06/17/24  0826 06/15/24  0523 06/14/24  0409   TOTAL PROTEIN 7.3 7.5 7.5   ALBUMIN 3.7 3.6 3.7   GLOBULIN 3.6 3.9 3.8   ALT (SGPT) 15 13 15   AST (SGOT) 35 14 19   BILIRUBIN 0.5 0.3 0.4   ALK PHOS 97 103 102         Lab 06/14/24  0921 06/14/24  0710 06/14/24  0409   PROBNP  --   --  4,208.0*   HSTROP T 117* 121* 117*              Lab 06/14/24  0710 06/14/24  0409   IRON  --  39*   IRON SATURATION (TSAT)  --  15*   TIBC  --  265*   TRANSFERRIN  --  178*   FERRITIN  --  115.10   FOLATE >20.00  --    VITAMIN B 12 727  --          Lab 06/16/24  0329 06/14/24  0433   PH, ARTERIAL 7.388 7.252*   PCO2, ARTERIAL 44.3 55.7*   PO2 ART 86.6 99.9   FIO2 28 36   HCO3 ART 26.7* 24.5   BASE EXCESS ART 1.4 -2.9*   CARBOXYHEMOGLOBIN 1.6 1.5     Brief Urine Lab Results  (Last result in the past 365 days)        Color   Clarity   Blood   Leuk Est   Nitrite   Protein   CREAT   Urine HCG        06/14/24 0918 Yellow   Clear   Negative   Trace   Negative   Negative                 Microbiology Results (last 10 days)       Procedure Component Value - Date/Time    COVID PRE-OP / PRE-PROCEDURE SCREENING ORDER (NO ISOLATION) - Swab, Nasopharynx [[id number]]  (Normal) Collected: 06/14/24 0416    Lab Status: Final result Specimen: Swab from Nasopharynx Updated: 06/14/24 0446    Narrative:      The following orders were created for panel order COVID PRE-OP / PRE-PROCEDURE SCREENING ORDER (NO ISOLATION) - Swab, Nasopharynx.  Procedure                               Abnormality         Status                     ---------                               -----------         ------                     COVID-19 and FLU A/B PCR...[[id number]]  Normal              Final result                 Please view results for these tests on the individual orders.    COVID-19 and FLU A/B PCR, 1 HR TAT - Swab, Nasopharynx [[id number]]  (Normal) Collected: 06/14/24 0416    Lab Status: Final result Specimen: Swab from Nasopharynx Updated: 06/14/24 0446     COVID19 Not Detected     Influenza A PCR Not Detected     Influenza B PCR Not Detected    Narrative:      Fact sheet for providers: https://www.fda.gov/media/954237/download    Fact sheet for patients: https://www.fda.gov/media/967822/download    Test performed by PCR.            EEG    Result Date: 6/18/2024  Reason for referral: 68 y.o.male  with altered mental status Technical Summary:  A 19 channel digital EEG was performed using the international 10-20 placement system, including eye leads and EKG leads. Duration: 20 minutes Findings: The patient is resting comfortably in bed and awake.  Diffuse medium amplitude 4-6 Hz intermixed delta and theta activity is seen over both hemispheres.  A clear posterior rhythm is not seen.  Drowsiness and light sleep are seen with overall slowing of the tracing.  Photic stimulation does not change the background.  Hyperventilation is not performed.  No epileptiform activity is seen. Video: Available Technical quality: Superior EKG: Regular, 70 bpm SUMMARY: Mild-moderate generalized slow No epileptiform activity is a     Diffuse cerebral dysfunction of at least mild degree but nonspecific.  This is most commonly seen due to toxic/metabolic cause This report is transcribed using the Dragon dictation system.      MRI Brain Without Contrast    Result Date: 6/18/2024  MRI BRAIN WO CONTRAST Date of Exam: 6/18/2024 12:07 AM EDT Indication: slurred speech, confusion.  Comparison: Head CT 6/14/2024. Technique:  Routine multiplanar/multisequence sequence images of the brain were obtained without contrast administration. Findings: There is no diffusion restriction to suggest acute infarct. There is a chronic infarct in the inferior right cerebellum. There is moderate generalized parenchymal volume loss. There is patchy FLAIR hyperintense signal within the phuc and extensively throughout the white matter. There is no evidence of acute intracranial hemorrhage. Orbits appear unremarkable. There is a partially empty sella turcica. There is marked thinning of the corpus callosum. The paranasal sinuses and mastoid air cells appear well aerated. Major arterial flow voids appear intact. Calvarial and superficial soft tissue signal is within normal limits. Temporomandibular joints and parotid glands appear symmetric. There is no mass  effect, midline shift or abnormal extra-axial collection.     Impression: 1.No acute intracranial abnormality. 2.Moderate generalized parenchymal volume loss and advanced chronic small vessel ischemic change. 3.Chronic infarct in the inferior right cerebellum. Electronically Signed: Kwan Gutierrez MD  6/18/2024 4:19 AM EDT  Workstation ID: [id number]    XR Abdomen KUB    Result Date: 6/17/2024  XR ABDOMEN KUB Date of Exam: 6/17/2024 11:32 AM EDT Indication: MRI clearance Comparison: None available. Findings: 3 supine views. There is some artifact on the patient. No radiopaque foreign bodies within the patient are identified. The bowel gas pattern is normal. There are vascular calcifications in the pelvis.     Impression: No radiopaque foreign body within the abdomen or pelvis. Electronically Signed: Danya Bonds MD  6/17/2024 12:09 PM EDT  Workstation ID: [id number]    Adult Transthoracic Echo Complete W/ Cont if Necessary Per Protocol    Result Date: 6/15/2024    Left ventricular ejection fraction appears to be 51 - 55%.   Left ventricular wall thickness is consistent with mild concentric hypertrophy.   AV sclerosis     CT Head Without Contrast    Result Date: 6/14/2024  CT HEAD WO CONTRAST Date of Exam: 6/14/2024 12:57 PM EDT Indication: confusion, chf, afib anticoagulated. assess for intracranial bleed. Comparison: None available. Technique: Axial CT images were obtained of the head without contrast administration.  Automated exposure control and iterative construction methods were used. FINDINGS: Gray-white differentiation is maintained and there is no evidence of intracranial hemorrhage, mass or mass effect. Age-related changes of the brain are present including volume loss and typical periventricular sequela of chronic small vessel ischemia. There is also evidence of an old right cerebellar infarct. There is otherwise no evidence of intracranial hemorrhage, mass or mass effect. The ventricles are normal in size  and configuration accounting for surrounding volume loss. The orbits are normal and the paranasal sinuses are grossly clear. The calvarium is intact.     Chronic and advanced age-related changes of the brain as above, otherwise without evidence of acute intracranial abnormality. Electronically Signed: Yohannes Hua MD  6/14/2024 1:26 PM EDT  Workstation ID: [id number]    XR Chest 1 View    Result Date: 6/14/2024  XR CHEST 1 VW Date of Exam: 6/14/2024 4:08 AM EDT Indication: SOA triage protocol Comparison: None available. Findings: The heart is enlarged. There is pulmonary vascular congestion. There are small bilateral pleural effusions. There is bilateral basilar atelectasis.     Impression: Cardiomegaly with pulmonary vascular congestion and small bilateral pleural effusions. Electronically Signed: Ton Busch MD  6/14/2024 4:38 AM EDT  Workstation ID: [id number]             Results for orders placed during the hospital encounter of 06/14/24    Adult Transthoracic Echo Complete W/ Cont if Necessary Per Protocol    Interpretation Summary    Left ventricular ejection fraction appears to be 51 - 55%.    Left ventricular wall thickness is consistent with mild concentric hypertrophy.    AV sclerosis      Plan for Follow-up of Pending Labs/Results:     Discharge Details        Discharge Medications        New Medications        Instructions Start Date   acetaminophen 325 MG tablet  Commonly known as: TYLENOL   650 mg, Oral, Every 4 Hours PRN      aspirin 81 MG chewable tablet   81 mg, Oral, Daily   Start Date: June 20, 2024     bisacodyl 5 MG EC tablet  Commonly known as: DULCOLAX   5 mg, Oral, Daily PRN      bisacodyl 10 MG suppository  Commonly known as: DULCOLAX   10 mg, Rectal, Daily PRN      budesonide 0.5 MG/2ML nebulizer solution  Commonly known as: PULMICORT   0.5 mg, Nebulization, 2 Times Daily - RT      dextrose 40 % gel  Commonly known as: GLUTOSE   15 g, Oral, Every 15 Minutes PRN      donepezil 5 MG  tablet  Commonly known as: ARICEPT   5 mg, Oral, Nightly      Insulin Lispro 100 UNIT/ML injection  Commonly known as: humaLOG   3-14 Units, Subcutaneous, 3 Times Daily With Meals      ipratropium-albuterol 0.5-2.5 mg/3 ml nebulizer  Commonly known as: DUO-NEB   3 mL, Nebulization, Every 4 Hours PRN      melatonin 5 MG tablet tablet   5 mg, Oral, Nightly      pantoprazole 40 MG EC tablet  Commonly known as: PROTONIX   40 mg, Oral, Every Early Morning   Start Date: June 20, 2024     polyethylene glycol 17 g packet  Commonly known as: MIRALAX   17 g, Oral, Daily PRN      sennosides-docusate 8.6-50 MG per tablet  Commonly known as: PERICOLACE   2 tablets, Oral, 2 Times Daily             Changes to Medications        Instructions Start Date   gabapentin 100 MG capsule  Commonly known as: NEURONTIN  What changed: when to take this   100 mg, Oral, 3 Times Daily             Continue These Medications        Instructions Start Date   apixaban 5 MG tablet tablet  Commonly known as: ELIQUIS   5 mg, Oral, 2 Times Daily      atorvastatin 40 MG tablet  Commonly known as: LIPITOR   40 mg, Oral, Daily      carvedilol 25 MG tablet  Commonly known as: COREG   25 mg, Oral, 2 Times Daily With Meals      lamoTRIgine 25 MG tablet  Commonly known as: LaMICtal   25 mg, Oral, 3 times daily      PARoxetine 10 MG tablet  Commonly known as: PAXIL   10 mg, Oral, Every Morning             Stop These Medications      amLODIPine 10 MG tablet  Commonly known as: NORVASC     doxazosin 8 MG tablet  Commonly known as: CARDURA     insulin glargine 100 UNIT/ML injection  Commonly known as: LANTUS SEMGLEE     lisinopril 40 MG tablet  Commonly known as: PRINIVIL,ZESTRIL     NIFEdipine XL 90 MG 24 hr tablet  Commonly known as: PROCARDIA XL              No Known Allergies      Discharge Disposition:  Rehab Facility or Unit (DC - External)    Diet:  Hospital:  Diet Order   Procedures    Diet: Cardiac, Diabetic, Vegetarian; Vegan (No animal products);  Healthy Heart (2-3 Na+); Consistent Carbohydrate; Texture: Soft to Chew (NDD 3); Soft to Chew: Chopped Meat; Fluid Consistency: Thin (IDDSI 0)            Activity:  as tolerated    Restrictions or Other Recommendations:  As tolerated       CODE STATUS:    Code Status and Medical Interventions:   Ordered at: 06/14/24 0604     Code Status (Patient has no pulse and is not breathing):    CPR (Attempt to Resuscitate)     Medical Interventions (Patient has pulse or is breathing):    Full Support       Future Appointments   Date Time Provider Department Center   6/19/2024  1:00 PM MED 13 [facility] None       Additional Instructions for the Follow-ups that You Need to Schedule       Discharge Follow-up with PCP   As directed       Currently Documented PCP:    Vadim Valadez MD    PCP Phone Number:    [phone]     Follow Up Details: 1-2 weeks        Discharge Follow-up with Specified Provider: f/u with saba ABREU; 1 Month   As directed      To: f/u with saba ABREU   Follow Up: 1 Month                      Liliana Gardner MD  06/19/24      Time Spent on Discharge:  I spent  35  minutes on this discharge activity which included: face-to-face encounter with the patient, reviewing the data in the system, coordination of the care with the nursing staff as well as consultants, documentation, and entering orders.            Electronically signed by Liliana Gardner MD at 06/19/24 9361

## 2024-10-14 ENCOUNTER — OFFICE VISIT (OUTPATIENT)
Dept: NEUROLOGY | Facility: CLINIC | Age: 69
End: 2024-10-14
Payer: MEDICARE

## 2024-10-14 VITALS — SYSTOLIC BLOOD PRESSURE: 130 MMHG | DIASTOLIC BLOOD PRESSURE: 90 MMHG | HEART RATE: 66 BPM | OXYGEN SATURATION: 97 %

## 2024-10-14 DIAGNOSIS — Z89.612 STATUS POST ABOVE-KNEE AMPUTATION OF BOTH LOWER EXTREMITIES: ICD-10-CM

## 2024-10-14 DIAGNOSIS — Z86.73 HISTORY OF CVA (CEREBROVASCULAR ACCIDENT): ICD-10-CM

## 2024-10-14 DIAGNOSIS — F01.A18 MILD VASCULAR DEMENTIA WITH OTHER BEHAVIORAL DISTURBANCE: Primary | ICD-10-CM

## 2024-10-14 DIAGNOSIS — I69.354 HEMIPARESIS OF LEFT NONDOMINANT SIDE AS LATE EFFECT OF CEREBRAL INFARCTION: ICD-10-CM

## 2024-10-14 DIAGNOSIS — Z89.611 STATUS POST ABOVE-KNEE AMPUTATION OF BOTH LOWER EXTREMITIES: ICD-10-CM

## 2024-10-14 DIAGNOSIS — Z74.09 IMPAIRED FUNCTIONAL MOBILITY, BALANCE, GAIT, AND ENDURANCE: ICD-10-CM

## 2024-10-14 RX ORDER — DONEPEZIL HYDROCHLORIDE 10 MG/1
10 TABLET, FILM COATED ORAL NIGHTLY
Qty: 90 TABLET | Refills: 3 | Status: SHIPPED | OUTPATIENT
Start: 2024-10-14

## 2024-10-14 RX ORDER — LISINOPRIL 40 MG/1
40 TABLET ORAL DAILY
COMMUNITY
Start: 2024-09-17

## 2024-10-14 RX ORDER — AMLODIPINE BESYLATE 10 MG/1
10 TABLET ORAL DAILY
COMMUNITY

## 2024-10-14 RX ORDER — ISOSORBIDE MONONITRATE 60 MG/1
60 TABLET, EXTENDED RELEASE ORAL DAILY
COMMUNITY
Start: 2024-10-04

## 2024-10-14 NOTE — PROGRESS NOTES
Subjective:     Patient ID: Lee Saintmartin is a 68 y.o. male.    CC:   Chief Complaint   Patient presents with    Memory Loss       HPI:   History of Present Illness  This is a 68-year-old male recently hospitalized at [facility] from 06/14/2024 to 06/19/2024 for hypertension, hyperlipidemia, history of CVA, paroxysmal atrial fibrillation, COPD, acute kidney injury, and anemia. During his hospitalization, he had significant confusion, delirium and it was suspected he has Vascular Dementia.    He has potential vascular dementia, concerns for bipolar disorder, type 2 diabetes, and stroke with residual left hemiplegia.     During his recent hospital stay, he was treated for acute respiratory failure with hypoxia and exhibited confusion and combativeness, reporting that he was not himself during the visit. A fall occurred one week prior to his hospital visit and a CT scan of the head at [facility] was unremarkable, and a subsequent CT scan during inpatient hospitalization showed no acute abnormalities. An MRI of the brain revealed moderate volume loss, advanced small vessel ischemic disease, and a chronic stroke in the right cerebellum, with no acute abnormalities. A neurology consultation was completed during hospitalization and concerns were noted of vascular dementia with hospital delirium.    He is currently on lamotrigine for bipolar disorder and donepezil 5 mg nightly for cognition. He also has chronic kidney disease stage III-IV and is a bilateral above-the-knee amputee with peripheral vascular disease. He is accompanied by a CNA from his nursing home facility.    He recalls his recent hospital stay, which was due to a fall. He has been living in a nursing home for the past 3.5 years. He underwent leg amputation 3 to 5 years ago and received therapy twice, but it was discontinued. His medications are managed by the nursing home. 3/27/2023 Left AKA.    He reports no history of sleep apnea or  use of a CPAP machine. He has difficulty sleeping at night but can sleep during the day. His mood is generally good, and he does not experience significant depression. He is unsure about any weakness on his left side following his stroke. He uses one prosthetic leg and needs another. He reports an improvement in his vision. He is currently taking medication for anxiety and depression, which he feels has been stable recently. He is the only historian today, so most of his history was obtained from Nursing Home and Hospital records.    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 He moved to [place] because his mother was sick. He denies smoking, tobacco use or vaping. He smoked when he was 14. He completed his Bachelor's Degree.    FAMILY HISTORY  He denies any family history of Alzheimer's or dementia.    The following portions of the patient's history were reviewed and updated as appropriate: allergies, current medications, past family history, past medical history, past social history, past surgical history, and problem list.    Past Medical History:   Diagnosis Date    Anemia     Atrial fibrillation     Dementia     Diabetes mellitus     GERD (gastroesophageal reflux disease)     Hyperlipidemia     Hypertension     Major depressive disorder     Pulmonary fibrosis     PVD (peripheral vascular disease)     Renal disorder     Stroke        Past Surgical History:   Procedure Laterality Date    ABOVE KNEE AMPUTATION Bilateral        Social History     Socioeconomic History    Marital status:    Tobacco Use    Smoking status: Never     Passive exposure: Past    Smokeless tobacco: Never   Vaping Use    Vaping status: Never Used   Substance and Sexual Activity    Alcohol use: Not Currently    Drug use: Never    Sexual activity: Not Currently       History reviewed. No pertinent family history.       Current Outpatient Medications:     acetaminophen (TYLENOL) 325 MG tablet, Take 2  tablets by mouth Every 4 (Four) Hours As Needed for Mild Pain., Disp: , Rfl:     amLODIPine (NORVASC) 10 MG tablet, Take 1 tablet by mouth Daily., Disp: , Rfl:     apixaban (ELIQUIS) 5 MG tablet tablet, Take 1 tablet by mouth 2 (Two) Times a Day., Disp: , Rfl:     aspirin 81 MG chewable tablet, Chew 1 tablet Daily., Disp: , Rfl:     atorvastatin (LIPITOR) 40 MG tablet, Take 1 tablet by mouth Daily., Disp: , Rfl:     bisacodyl (DULCOLAX) 10 MG suppository, Insert 1 suppository into the rectum Daily As Needed for Constipation (Use if bisacodyl oral is ineffective)., Disp: , Rfl:     bisacodyl (DULCOLAX) 5 MG EC tablet, Take 1 tablet by mouth Daily As Needed for Constipation (Use if polyethylene glycol is ineffective)., Disp: , Rfl:     budesonide (PULMICORT) 0.5 MG/2ML nebulizer solution, Take 2 mL by nebulization 2 (Two) Times a Day., Disp: , Rfl:     carvedilol (COREG) 25 MG tablet, Take 1 tablet by mouth 2 (Two) Times a Day With Meals., Disp: , Rfl:     dextrose (GLUTOSE) 40 % gel, Take 15 g by mouth Every 15 (Fifteen) Minutes As Needed for Low Blood Sugar (Blood sugar less than 70)., Disp: , Rfl:     donepezil (ARICEPT) 10 MG tablet, Take 1 tablet by mouth Every Night., Disp: 90 tablet, Rfl: 3    Insulin Lispro (humaLOG) 100 UNIT/ML injection, Inject 3-14 Units under the skin into the appropriate area as directed 3 (Three) Times a Day With Meals., Disp: , Rfl:     ipratropium-albuterol (DUO-NEB) 0.5-2.5 mg/3 ml nebulizer, Take 3 mL by nebulization Every 4 (Four) Hours As Needed for Shortness of Air., Disp: , Rfl:     isosorbide mononitrate (IMDUR) 60 MG 24 hr tablet, Take 1 tablet by mouth Daily., Disp: , Rfl:     lamoTRIgine (LaMICtal) 25 MG tablet, Take 1 tablet by mouth 3 times a day., Disp: , Rfl:     lisinopril (PRINIVIL,ZESTRIL) 40 MG tablet, Take 1 tablet by mouth Daily., Disp: , Rfl:     pantoprazole (PROTONIX) 40 MG EC tablet, Take 1 tablet by mouth Every Morning., Disp: , Rfl:     PARoxetine (PAXIL) 10  MG tablet, Take 1 tablet by mouth Every Morning., Disp: , Rfl:     polyethylene glycol (MIRALAX) 17 g packet, Take 17 g by mouth Daily As Needed (Use if senna-docusate is ineffective)., Disp: , Rfl:     sennosides-docusate (PERICOLACE) 8.6-50 MG per tablet, Take 2 tablets by mouth 2 (Two) Times a Day., Disp: , Rfl:     gabapentin (NEURONTIN) 100 MG capsule, Take 1 capsule by mouth 3 (Three) Times a Day for 3 days., Disp: 9 capsule, Rfl: 0    melatonin 5 MG tablet tablet, Take 1 tablet by mouth Every Night. (Patient not taking: Reported on 10/14/2024), Disp: , Rfl:      Review of Systems   Constitutional:  Positive for activity change.   Musculoskeletal:  Positive for arthralgias and gait problem.   Neurological:  Positive for weakness.   Psychiatric/Behavioral:  Positive for decreased concentration and sleep disturbance. The patient is nervous/anxious.    All other systems reviewed and are negative.       Objective:  /90   Pulse 66   SpO2 97%     Neurological Exam  Mental Status  Alert. Oriented only to person, place and situation. At 5 minutes (1/5) Unable to copy figure. Clock drawing is abnormal. Speech is normal. Language is fluent with no aphasia. Fund of knowledge is abnormal.    Cranial Nerves  CN II: Visual acuity is normal. Visual fields full to confrontation.  CN III, IV, VI: Extraocular movements intact bilaterally. Normal lids and orbits bilaterally. Pupils equal round and reactive to light bilaterally.  CN V: Facial sensation is normal.  CN VII:  Right: There is no facial weakness.  Left: There is central facial weakness.  CN IX, X: Palate elevates symmetrically. Normal gag reflex.  CN XI:  Right: Sternocleidomastoid strength is normal. Trapezius strength is normal.  Left: Sternocleidomastoid strength is weak. Trapezius strength is weak. Minimally weaker left side.  CN XII: Tongue midline without atrophy or fasciculations.    Motor  Normal muscle bulk throughout. No fasciculations present.  Normal muscle tone. No abnormal involuntary movements. Strength is 5/5 in all four extremities except as noted. Left hemiparesis. Minimal.    BILATERAL ABOVE THE KNEE AMPUTEE.    Reflexes                                            Right                      Left  Brachioradialis                    2+                         2+  Biceps                                 2+                         2+  Triceps                                2+                         2+  Deep tendon reflexes: Bilateral AKA.    Coordination  Right: Finger-to-nose normal.Left: Finger-to-nose normal.    Gait      Unable to ambulate. Bilateral AKA, no prosthesis today, in wheelchair.        Physical Exam  Constitutional:       Appearance: Normal appearance. He is obese.      Comments:   Chronically ill, in no acute distress   Eyes:      General: Lids are normal.      Extraocular Movements: Extraocular movements intact.      Pupils: Pupils are equal, round, and reactive to light.   Neurological:      Mental Status: He is alert.      Deep Tendon Reflexes:      Reflex Scores:       Tricep reflexes are 2+ on the right side and 2+ on the left side.       Bicep reflexes are 2+ on the right side and 2+ on the left side.       Brachioradialis reflexes are 2+ on the right side and 2+ on the left side.  Psychiatric:         Mood and Affect: Mood and affect normal.         Speech: Speech normal.         Behavior: Behavior is slowed.         Cognition and Memory: He exhibits impaired recent memory.         Judgment: Judgment is inappropriate.         Results:  Results  Imaging 6/2024 at [facility]:  6/14/2024-CT scan of the head showed no acute abnormalities.   6/18/2024-MRI of the brain showed moderate volume loss and advanced small vessel ischemic disease, chronic stroke in the right cerebellum.   6/18/2024-EEG showed mild to moderate generalized slowing and diffuse cerebral dysfunction most likely due to toxic metabolic cause.  6/14/2024  B12 727, Folate >20, both WNL  6/17/2024-TSH 2.640 WNL    Testing  Chance cognitive assessment score is a 20 out of 30, 1 out of 5 for word recall.    Assessment/Plan:     Diagnoses and all orders for this visit:    1. 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  Dispense: 90 tablet; Refill: 3    2. Hemiparesis of left nondominant side as late effect of cerebral infarction  Comments:  chronic since right cerebellar cva    3. Impaired functional mobility, balance, gait, and endurance  Comments:  continue nursing home care    4. History of CVA (cerebrovascular accident)  Comments:  continue aspirin, eliquis and statin for 2nd stroke prevention    5. Status post above-knee amputation of both lower extremities           Assessment & Plan  Total time of visit today was 42 minutes total including reviewing hospital records, Neurology consultation inpatient, nursing home records, obtaining history from patient, completing exam, discussing findings and recommendations in detail.    1. Vascular Dementia.  He presents with mild vascular dementia, confirmed by MRI showing atrophy, extensive white matter changes, and an old stroke. The [place] Cognitive Assessment score is 20 out of 30, indicating mild cognitive impairment. The importance of managing vascular risk factors, including diabetes, high blood pressure, and high cholesterol, was discussed. It is recommended to increase donepezil to 10 mg nightly, provided it is safe for his renal function and acceptable to the facility. Information on vascular dementia was printed for his reference. He expressed understanding and agreed with the plan. Full notes will be forwarded to [facility] and his attending physician, who can contact us with any questions or concerns prior to the next clinic follow-up. Based on  cognitive testing at next visit or any additional progression of cognition, would recommend consideration of adding low dose memantine 5mg 1-2 times a day adjusted for CKD.    2. Chronic Kidney Disease Stage III-IV.  His chronic kidney disease was noted. The increase in donepezil to 10 mg nightly is recommended only if it is safe for his renal function.    3. History of Stroke with Residual Left Hemiplegia.  He has a history of stroke with residual left hemiplegia, though current physical examination shows minimal weakness. No new treatment was discussed for this condition. He should continue Eliquis, aspirin and statin therapy for secondary stroke prevention.    Follow-up  He is scheduled for a follow-up visit in May 2025 for reevaluation of symptoms or sooner if necessary.    Reviewed medications, potential side effects and signs and symptoms to report. Discussed risk versus benefits of treatment plan with patient and/or family-including medications, labs and radiology that may be ordered. Addressed questions and concerns during visit. Patient and/or family verbalized understanding and agree with plan.    During this visit the following were done:  Labs Reviewed [x]    Labs Ordered []    Radiology Reports Reviewed [x]    Radiology Ordered []    PCP Records Reviewed []    Referring Provider Records Reviewed []    ER Records Reviewed [x]    Hospital Records Reviewed [x]    History Obtained From Family []    Radiology Images Reviewed [x]    Other Reviewed [x]    Records Requested []      10/14/24   15:40 EDT    Patient or patient representative verbalized consent for the use of Ambient Listening during the visit with  BRADY Echevarria for chart documentation. 10/15/2024  08:21 EDT    Note to patient: The 21st Century Cures Act makes medical notes like these available to patients in the interest of transparency. However, be advised this is a medical document. It is intended as peer to peer communication. It  is written in medical language and may contain abbreviations or verbiage that are unfamiliar. It may appear blunt or direct. Medical documents are intended to carry relevant information, facts as evident, and the clinical opinion of the provider.

## 2024-10-14 NOTE — LETTER
October 15, 2024     Vadim Valadez MD  [address] [place] [address]    Patient: Lee Saintmartin   YOB: 1955   Date of Visit: 10/14/2024       Dear Vadim Valadez MD    Lee Saintmartin was in my office today. Below is a copy of my note.    If you have questions, please do not hesitate to call me. I look forward to following Jimmie along with you.         Sincerely,        BRADY Echevarria        CC: [facility]    Subjective:     Patient ID: Lee Saintmartin is a 68 y.o. male.    CC:   Chief Complaint   Patient presents with   • Memory Loss       HPI:   History of Present Illness  This is a 68-year-old male recently hospitalized at [facility] from 06/14/2024 to 06/19/2024 for hypertension, hyperlipidemia, history of CVA, paroxysmal atrial fibrillation, COPD, acute kidney injury, and anemia. During his hospitalization, he had significant confusion, delirium and it was suspected he has Vascular Dementia.    He has potential vascular dementia, concerns for bipolar disorder, type 2 diabetes, and stroke with residual left hemiplegia.     During his recent hospital stay, he was treated for acute respiratory failure with hypoxia and exhibited confusion and combativeness, reporting that he was not himself during the visit. A fall occurred one week prior to his hospital visit and a CT scan of the head at [facility] was unremarkable, and a subsequent CT scan during inpatient hospitalization showed no acute abnormalities. An MRI of the brain revealed moderate volume loss, advanced small vessel ischemic disease, and a chronic stroke in the right cerebellum, with no acute abnormalities. A neurology consultation was completed during hospitalization and concerns were noted of vascular dementia with hospital delirium.    He is currently on lamotrigine for bipolar disorder and donepezil 5 mg nightly for cognition. He also has chronic kidney disease  stage III-IV and is a bilateral above-the-knee amputee with peripheral vascular disease. He is accompanied by a CNA from his nursing home facility.    He recalls his recent hospital stay, which was due to a fall. He has been living in a nursing home for the past 3.5 years. He underwent leg amputation 3 to 5 years ago and received therapy twice, but it was discontinued. His medications are managed by the nursing home. 3/27/2023 Left AKA.    He reports no history of sleep apnea or use of a CPAP machine. He has difficulty sleeping at night but can sleep during the day. His mood is generally good, and he does not experience significant depression. He is unsure about any weakness on his left side following his stroke. He uses one prosthetic leg and needs another. He reports an improvement in his vision. He is currently taking medication for anxiety and depression, which he feels has been stable recently. He is the only historian today, so most of his history was obtained from Nursing Home and Hospital records.    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 He moved to [place] because his mother was sick. He denies smoking, tobacco use or vaping. He smoked when he was 14. He completed his Bachelor's Degree.    FAMILY HISTORY  He denies any family history of Alzheimer's or dementia.    The following portions of the patient's history were reviewed and updated as appropriate: allergies, current medications, past family history, past medical history, past social history, past surgical history, and problem list.    Past Medical History:   Diagnosis Date   • Anemia    • Atrial fibrillation    • Dementia    • Diabetes mellitus    • GERD (gastroesophageal reflux disease)    • Hyperlipidemia    • Hypertension    • Major depressive disorder    • Pulmonary fibrosis    • PVD (peripheral vascular disease)    • Renal disorder    • Stroke        Past Surgical History:   Procedure Laterality Date   •  ABOVE KNEE AMPUTATION Bilateral        Social History     Socioeconomic History   • Marital status:    Tobacco Use   • Smoking status: Never     Passive exposure: Past   • Smokeless tobacco: Never   Vaping Use   • Vaping status: Never Used   Substance and Sexual Activity   • Alcohol use: Not Currently   • Drug use: Never   • Sexual activity: Not Currently       History reviewed. No pertinent family history.       Current Outpatient Medications:   •  acetaminophen (TYLENOL) 325 MG tablet, Take 2 tablets by mouth Every 4 (Four) Hours As Needed for Mild Pain., Disp: , Rfl:   •  amLODIPine (NORVASC) 10 MG tablet, Take 1 tablet by mouth Daily., Disp: , Rfl:   •  apixaban (ELIQUIS) 5 MG tablet tablet, Take 1 tablet by mouth 2 (Two) Times a Day., Disp: , Rfl:   •  aspirin 81 MG chewable tablet, Chew 1 tablet Daily., Disp: , Rfl:   •  atorvastatin (LIPITOR) 40 MG tablet, Take 1 tablet by mouth Daily., Disp: , Rfl:   •  bisacodyl (DULCOLAX) 10 MG suppository, Insert 1 suppository into the rectum Daily As Needed for Constipation (Use if bisacodyl oral is ineffective)., Disp: , Rfl:   •  bisacodyl (DULCOLAX) 5 MG EC tablet, Take 1 tablet by mouth Daily As Needed for Constipation (Use if polyethylene glycol is ineffective)., Disp: , Rfl:   •  budesonide (PULMICORT) 0.5 MG/2ML nebulizer solution, Take 2 mL by nebulization 2 (Two) Times a Day., Disp: , Rfl:   •  carvedilol (COREG) 25 MG tablet, Take 1 tablet by mouth 2 (Two) Times a Day With Meals., Disp: , Rfl:   •  dextrose (GLUTOSE) 40 % gel, Take 15 g by mouth Every 15 (Fifteen) Minutes As Needed for Low Blood Sugar (Blood sugar less than 70)., Disp: , Rfl:   •  donepezil (ARICEPT) 10 MG tablet, Take 1 tablet by mouth Every Night., Disp: 90 tablet, Rfl: 3  •  Insulin Lispro (humaLOG) 100 UNIT/ML injection, Inject 3-14 Units under the skin into the appropriate area as directed 3 (Three) Times a Day With Meals., Disp: , Rfl:   •  ipratropium-albuterol (DUO-NEB) 0.5-2.5  mg/3 ml nebulizer, Take 3 mL by nebulization Every 4 (Four) Hours As Needed for Shortness of Air., Disp: , Rfl:   •  isosorbide mononitrate (IMDUR) 60 MG 24 hr tablet, Take 1 tablet by mouth Daily., Disp: , Rfl:   •  lamoTRIgine (LaMICtal) 25 MG tablet, Take 1 tablet by mouth 3 times a day., Disp: , Rfl:   •  lisinopril (PRINIVIL,ZESTRIL) 40 MG tablet, Take 1 tablet by mouth Daily., Disp: , Rfl:   •  pantoprazole (PROTONIX) 40 MG EC tablet, Take 1 tablet by mouth Every Morning., Disp: , Rfl:   •  PARoxetine (PAXIL) 10 MG tablet, Take 1 tablet by mouth Every Morning., Disp: , Rfl:   •  polyethylene glycol (MIRALAX) 17 g packet, Take 17 g by mouth Daily As Needed (Use if senna-docusate is ineffective)., Disp: , Rfl:   •  sennosides-docusate (PERICOLACE) 8.6-50 MG per tablet, Take 2 tablets by mouth 2 (Two) Times a Day., Disp: , Rfl:   •  gabapentin (NEURONTIN) 100 MG capsule, Take 1 capsule by mouth 3 (Three) Times a Day for 3 days., Disp: 9 capsule, Rfl: 0  •  melatonin 5 MG tablet tablet, Take 1 tablet by mouth Every Night. (Patient not taking: Reported on 10/14/2024), Disp: , Rfl:      Review of Systems   Constitutional:  Positive for activity change.   Musculoskeletal:  Positive for arthralgias and gait problem.   Neurological:  Positive for weakness.   Psychiatric/Behavioral:  Positive for decreased concentration and sleep disturbance. The patient is nervous/anxious.    All other systems reviewed and are negative.       Objective:  /90   Pulse 66   SpO2 97%     Neurological Exam  Mental Status  Alert. Oriented only to person, place and situation. At 5 minutes (1/5) Unable to copy figure. Clock drawing is abnormal. Speech is normal. Language is fluent with no aphasia. Fund of knowledge is abnormal.    Cranial Nerves  CN II: Visual acuity is normal. Visual fields full to confrontation.  CN III, IV, VI: Extraocular movements intact bilaterally. Normal lids and orbits bilaterally. Pupils equal round and  reactive to light bilaterally.  CN V: Facial sensation is normal.  CN VII:  Right: There is no facial weakness.  Left: There is central facial weakness.  CN IX, X: Palate elevates symmetrically. Normal gag reflex.  CN XI:  Right: Sternocleidomastoid strength is normal. Trapezius strength is normal.  Left: Sternocleidomastoid strength is weak. Trapezius strength is weak. Minimally weaker left side.  CN XII: Tongue midline without atrophy or fasciculations.    Motor  Normal muscle bulk throughout. No fasciculations present. Normal muscle tone. No abnormal involuntary movements. Strength is 5/5 in all four extremities except as noted. Left hemiparesis. Minimal.    BILATERAL ABOVE THE KNEE AMPUTEE.    Reflexes                                            Right                      Left  Brachioradialis                    2+                         2+  Biceps                                 2+                         2+  Triceps                                2+                         2+  Deep tendon reflexes: Bilateral AKA.    Coordination  Right: Finger-to-nose normal.Left: Finger-to-nose normal.    Gait      Unable to ambulate. Bilateral AKA, no prosthesis today, in wheelchair.        Physical Exam  Constitutional:       Appearance: Normal appearance. He is obese.      Comments:   Chronically ill, in no acute distress   Eyes:      General: Lids are normal.      Extraocular Movements: Extraocular movements intact.      Pupils: Pupils are equal, round, and reactive to light.   Neurological:      Mental Status: He is alert.      Deep Tendon Reflexes:      Reflex Scores:       Tricep reflexes are 2+ on the right side and 2+ on the left side.       Bicep reflexes are 2+ on the right side and 2+ on the left side.       Brachioradialis reflexes are 2+ on the right side and 2+ on the left side.  Psychiatric:         Mood and Affect: Mood and affect normal.         Speech: Speech normal.         Behavior: Behavior is slowed.          Cognition and Memory: He exhibits impaired recent memory.         Judgment: Judgment is inappropriate.         Results:  Results  Imaging 6/2024 at [facility]:  6/14/2024-CT scan of the head showed no acute abnormalities.   6/18/2024-MRI of the brain showed moderate volume loss and advanced small vessel ischemic disease, chronic stroke in the right cerebellum.   6/18/2024-EEG showed mild to moderate generalized slowing and diffuse cerebral dysfunction most likely due to toxic metabolic cause.  6/14/2024 B12 727, Folate >20, both WNL  6/17/2024-TSH 2.640 WNL    Testing  Chance cognitive assessment score is a 20 out of 30, 1 out of 5 for word recall.    Assessment/Plan:     Diagnoses and all orders for this visit:    1. 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  Dispense: 90 tablet; Refill: 3    2. Hemiparesis of left nondominant side as late effect of cerebral infarction  Comments:  chronic since right cerebellar cva    3. Impaired functional mobility, balance, gait, and endurance  Comments:  continue nursing home care    4. History of CVA (cerebrovascular accident)  Comments:  continue aspirin, eliquis and statin for 2nd stroke prevention    5. Status post above-knee amputation of both lower extremities           Assessment & Plan  Total time of visit today was 42 minutes total including reviewing hospital records, Neurology consultation inpatient, nursing home records, obtaining history from patient, completing exam, discussing findings and recommendations in detail.    1. Vascular Dementia.  He presents with mild vascular dementia, confirmed by MRI showing atrophy, extensive white matter changes, and an old stroke. The Holmen Cognitive Assessment score is 20 out of 30, indicating mild cognitive impairment. The importance of managing vascular  risk factors, including diabetes, high blood pressure, and high cholesterol, was discussed. It is recommended to increase donepezil to 10 mg nightly, provided it is safe for his renal function and acceptable to the facility. Information on vascular dementia was printed for his reference. He expressed understanding and agreed with the plan. Full notes will be forwarded to [facility] and his attending physician, who can contact us with any questions or concerns prior to the next clinic follow-up. Based on cognitive testing at next visit or any additional progression of cognition, would recommend consideration of adding low dose memantine 5mg 1-2 times a day adjusted for CKD.    2. Chronic Kidney Disease Stage III-IV.  His chronic kidney disease was noted. The increase in donepezil to 10 mg nightly is recommended only if it is safe for his renal function.    3. History of Stroke with Residual Left Hemiplegia.  He has a history of stroke with residual left hemiplegia, though current physical examination shows minimal weakness. No new treatment was discussed for this condition. He should continue Eliquis, aspirin and statin therapy for secondary stroke prevention.    Follow-up  He is scheduled for a follow-up visit in May 2025 for reevaluation of symptoms or sooner if necessary.    Reviewed medications, potential side effects and signs and symptoms to report. Discussed risk versus benefits of treatment plan with patient and/or family-including medications, labs and radiology that may be ordered. Addressed questions and concerns during visit. Patient and/or family verbalized understanding and agree with plan.    During this visit the following were done:  Labs Reviewed [x]    Labs Ordered []    Radiology Reports Reviewed [x]    Radiology Ordered []    PCP Records Reviewed []    Referring Provider Records Reviewed []    ER Records Reviewed [x]    Hospital Records Reviewed [x]    History Obtained From Family []     Radiology Images Reviewed [x]    Other Reviewed [x]    Records Requested []      10/14/24   15:40 EDT    Patient or patient representative verbalized consent for the use of Ambient Listening during the visit with  BRADY Echevarria for chart documentation. 10/15/2024  08:21 EDT    Note to patient: The 21st Century Cures Act makes medical notes like these available to patients in the interest of transparency. However, be advised this is a medical document. It is intended as peer to peer communication. It is written in medical language and may contain abbreviations or verbiage that are unfamiliar. It may appear blunt or direct. Medical documents are intended to carry relevant information, facts as evident, and the clinical opinion of the provider.

## 2024-10-15 PROBLEM — I25.10 CORONARY ARTERY DISEASE: Chronic | Status: ACTIVE | Noted: 2024-10-15

## 2024-10-15 PROBLEM — I73.9 PERIPHERAL VASCULAR DISEASE: Chronic | Status: ACTIVE | Noted: 2021-07-09

## 2024-10-15 PROBLEM — G31.9 BRAIN ATROPHY: Chronic | Status: ACTIVE | Noted: 2020-10-19

## 2024-10-15 PROBLEM — E66.01 MORBID OBESITY: Chronic | Status: ACTIVE | Noted: 2023-03-10

## 2024-10-15 PROBLEM — E11.9 DIABETES MELLITUS: Chronic | Status: ACTIVE | Noted: 2023-03-15

## 2024-10-15 PROBLEM — Z89.611: Status: ACTIVE | Noted: 2020-10-30

## 2024-10-15 PROBLEM — F01.50 VASCULAR DEMENTIA: Status: ACTIVE | Noted: 2024-10-15

## 2024-10-15 PROBLEM — I69.354 HEMIPARESIS AFFECTING LEFT SIDE AS LATE EFFECT OF CEREBROVASCULAR ACCIDENT: Chronic | Status: ACTIVE | Noted: 2020-10-01

## 2024-10-15 PROBLEM — E11.9 TYPE 2 DIABETES MELLITUS, WITH LONG-TERM CURRENT USE OF INSULIN: Chronic | Status: ACTIVE | Noted: 2020-10-01

## 2024-10-15 PROBLEM — I70.25 ATHEROSCLEROSIS OF NATIVE ARTERY OF EXTREMITY WITH ULCERATION: Chronic | Status: ACTIVE | Noted: 2021-01-29

## 2024-10-15 PROBLEM — M86.9 OSTEOMYELITIS, ANKLE AND FOOT: Chronic | Status: ACTIVE | Noted: 2021-01-28

## 2024-10-15 PROBLEM — I69.354 HEMIPARESIS OF LEFT NONDOMINANT SIDE AS LATE EFFECT OF CEREBRAL INFARCTION: Status: ACTIVE | Noted: 2024-10-15

## 2024-10-15 PROBLEM — Z86.79 HISTORY OF ATRIAL FIBRILLATION: Status: ACTIVE | Noted: 2020-10-01

## 2024-10-15 PROBLEM — E11.42 DIABETIC PERIPHERAL NEUROPATHY: Chronic | Status: ACTIVE | Noted: 2023-03-15

## 2024-10-15 PROBLEM — I70.222 CRITICAL LIMB ISCHEMIA OF LEFT LOWER EXTREMITY: Status: ACTIVE | Noted: 2023-03-09

## 2024-10-15 PROBLEM — Z89.612: Status: ACTIVE | Noted: 2020-10-30

## 2024-10-15 PROBLEM — Z74.09 IMPAIRED FUNCTIONAL MOBILITY, BALANCE, GAIT, AND ENDURANCE: Status: ACTIVE | Noted: 2024-10-15

## 2024-10-15 PROBLEM — Z89.611 HX OF AKA (ABOVE KNEE AMPUTATION), RIGHT: Chronic | Status: ACTIVE | Noted: 2020-10-30

## 2024-10-15 PROBLEM — I67.89 CEREBRAL MICROVASCULAR DISEASE: Status: ACTIVE | Noted: 2020-10-19

## 2024-10-15 PROBLEM — R53.1 WEAKNESS: Status: ACTIVE | Noted: 2023-04-03

## 2024-10-15 PROBLEM — R53.81 DEBILITY: Chronic | Status: ACTIVE | Noted: 2024-10-15

## 2024-10-15 PROBLEM — Z79.4 TYPE 2 DIABETES MELLITUS, WITH LONG-TERM CURRENT USE OF INSULIN: Chronic | Status: ACTIVE | Noted: 2020-10-01

## 2025-04-07 ENCOUNTER — TRANSCRIBE ORDERS (OUTPATIENT)
Dept: ADMINISTRATIVE | Facility: HOSPITAL | Age: 70
End: 2025-04-07
Payer: MEDICARE

## 2025-04-07 DIAGNOSIS — I10 HYPERTENSION, ESSENTIAL: Primary | ICD-10-CM
